# Patient Record
Sex: FEMALE | Race: WHITE | NOT HISPANIC OR LATINO | Employment: FULL TIME | URBAN - METROPOLITAN AREA
[De-identification: names, ages, dates, MRNs, and addresses within clinical notes are randomized per-mention and may not be internally consistent; named-entity substitution may affect disease eponyms.]

---

## 2017-01-27 ENCOUNTER — OFFICE VISIT (OUTPATIENT)
Dept: FAMILY MEDICINE | Facility: OTHER | Age: 27
End: 2017-01-27
Payer: COMMERCIAL

## 2017-01-27 VITALS — HEIGHT: 66 IN | BODY MASS INDEX: 22.5 KG/M2 | WEIGHT: 140 LBS

## 2017-01-27 DIAGNOSIS — R53.83 FATIGUE, UNSPECIFIED TYPE: ICD-10-CM

## 2017-01-27 DIAGNOSIS — N89.8 VAGINAL ITCHING: ICD-10-CM

## 2017-01-27 DIAGNOSIS — R10.9 LEFT FLANK DISCOMFORT: ICD-10-CM

## 2017-01-27 DIAGNOSIS — R45.0 JITTERY FEELING: ICD-10-CM

## 2017-01-27 DIAGNOSIS — R30.0 DYSURIA: Primary | ICD-10-CM

## 2017-01-27 LAB
ALBUMIN SERPL-MCNC: 4.1 G/DL (ref 3.4–5)
ALBUMIN UR-MCNC: NEGATIVE MG/DL
ALP SERPL-CCNC: 43 U/L (ref 40–150)
ALT SERPL W P-5'-P-CCNC: 30 U/L (ref 0–50)
ANION GAP SERPL CALCULATED.3IONS-SCNC: 9 MMOL/L (ref 3–14)
APPEARANCE UR: CLEAR
AST SERPL W P-5'-P-CCNC: 22 U/L (ref 0–45)
BACTERIA #/AREA URNS HPF: ABNORMAL /HPF
BASOPHILS # BLD AUTO: 0 10E9/L (ref 0–0.2)
BASOPHILS NFR BLD AUTO: 0.2 %
BILIRUB SERPL-MCNC: 0.7 MG/DL (ref 0.2–1.3)
BILIRUB UR QL STRIP: NEGATIVE
BUN SERPL-MCNC: 9 MG/DL (ref 7–30)
CALCIUM SERPL-MCNC: 8.7 MG/DL (ref 8.5–10.1)
CHLORIDE SERPL-SCNC: 106 MMOL/L (ref 94–109)
CO2 SERPL-SCNC: 25 MMOL/L (ref 20–32)
COLOR UR AUTO: YELLOW
CREAT SERPL-MCNC: 0.66 MG/DL (ref 0.52–1.04)
DIFFERENTIAL METHOD BLD: NORMAL
EOSINOPHIL # BLD AUTO: 0.2 10E9/L (ref 0–0.7)
EOSINOPHIL NFR BLD AUTO: 2.9 %
ERYTHROCYTE [DISTWIDTH] IN BLOOD BY AUTOMATED COUNT: 12.2 % (ref 10–15)
GFR SERPL CREATININE-BSD FRML MDRD: NORMAL ML/MIN/1.7M2
GLUCOSE SERPL-MCNC: 72 MG/DL (ref 70–99)
GLUCOSE UR STRIP-MCNC: NEGATIVE MG/DL
HCT VFR BLD AUTO: 40.2 % (ref 35–47)
HGB BLD-MCNC: 13.9 G/DL (ref 11.7–15.7)
HGB UR QL STRIP: ABNORMAL
KETONES UR STRIP-MCNC: 15 MG/DL
LEUKOCYTE ESTERASE UR QL STRIP: NEGATIVE
LYMPHOCYTES # BLD AUTO: 1.9 10E9/L (ref 0.8–5.3)
LYMPHOCYTES NFR BLD AUTO: 29.1 %
MCH RBC QN AUTO: 32.3 PG (ref 26.5–33)
MCHC RBC AUTO-ENTMCNC: 34.6 G/DL (ref 31.5–36.5)
MCV RBC AUTO: 93 FL (ref 78–100)
MICRO REPORT STATUS: NORMAL
MONOCYTES # BLD AUTO: 0.5 10E9/L (ref 0–1.3)
MONOCYTES NFR BLD AUTO: 6.8 %
NEUTROPHILS # BLD AUTO: 4.1 10E9/L (ref 1.6–8.3)
NEUTROPHILS NFR BLD AUTO: 61 %
NITRATE UR QL: NEGATIVE
PH UR STRIP: 6 PH (ref 5–7)
PLATELET # BLD AUTO: 306 10E9/L (ref 150–450)
POTASSIUM SERPL-SCNC: 3.6 MMOL/L (ref 3.4–5.3)
PROT SERPL-MCNC: 7.5 G/DL (ref 6.8–8.8)
RBC # BLD AUTO: 4.31 10E12/L (ref 3.8–5.2)
RBC #/AREA URNS AUTO: ABNORMAL /HPF (ref 0–2)
SODIUM SERPL-SCNC: 140 MMOL/L (ref 133–144)
SP GR UR STRIP: 1.02 (ref 1–1.03)
SPECIMEN SOURCE: NORMAL
TSH SERPL DL<=0.005 MIU/L-ACNC: 1.85 MU/L (ref 0.4–4)
URN SPEC COLLECT METH UR: ABNORMAL
UROBILINOGEN UR STRIP-ACNC: 0.2 EU/DL (ref 0.2–1)
WBC # BLD AUTO: 6.6 10E9/L (ref 4–11)
WBC #/AREA URNS AUTO: ABNORMAL /HPF (ref 0–2)
WET PREP SPEC: NORMAL

## 2017-01-27 PROCEDURE — 82306 VITAMIN D 25 HYDROXY: CPT | Performed by: NURSE PRACTITIONER

## 2017-01-27 PROCEDURE — 87210 SMEAR WET MOUNT SALINE/INK: CPT | Performed by: NURSE PRACTITIONER

## 2017-01-27 PROCEDURE — 85025 COMPLETE CBC W/AUTO DIFF WBC: CPT | Performed by: NURSE PRACTITIONER

## 2017-01-27 PROCEDURE — 81001 URINALYSIS AUTO W/SCOPE: CPT | Performed by: NURSE PRACTITIONER

## 2017-01-27 PROCEDURE — 80053 COMPREHEN METABOLIC PANEL: CPT | Performed by: NURSE PRACTITIONER

## 2017-01-27 PROCEDURE — 84443 ASSAY THYROID STIM HORMONE: CPT | Performed by: NURSE PRACTITIONER

## 2017-01-27 PROCEDURE — 99214 OFFICE O/P EST MOD 30 MIN: CPT | Mod: 25 | Performed by: NURSE PRACTITIONER

## 2017-01-27 PROCEDURE — 36415 COLL VENOUS BLD VENIPUNCTURE: CPT | Performed by: NURSE PRACTITIONER

## 2017-01-27 RX ORDER — FLUCONAZOLE 150 MG/1
150 TABLET ORAL ONCE
Qty: 1 TABLET | Refills: 0 | Status: SHIPPED | OUTPATIENT
Start: 2017-01-27 | End: 2017-01-27

## 2017-01-27 ASSESSMENT — PAIN SCALES - GENERAL: PAINLEVEL: MODERATE PAIN (4)

## 2017-01-27 NOTE — PATIENT INSTRUCTIONS
Please increase fluids try to avoid sugary drinks. I will contact you with your lab results via my chart.     Thank you  Hiwot Giang CNP

## 2017-01-27 NOTE — MR AVS SNAPSHOT
After Visit Summary   1/27/2017    Zuleyka Leslie    MRN: [id number]           Patient Information     Date Of Birth          1990        Visit Information        Provider Department      1/27/2017 12:00 PM Hiwot Giang APRN CNP [facility]        Today's Diagnoses     Dysuria    -  1     Vaginal itching         Fatigue, unspecified type         Left flank discomfort         Jittery feeling           Care Instructions    Please increase fluids try to avoid sugary drinks. I will contact you with your lab results via my chart.     Thank you  Hiwot Giang CNP          Follow-ups after your visit        Who to contact     If you have questions or need follow up information about today's clinic visit or your schedule please contact [facility] directly at [phone].  Normal or non-critical lab and imaging results will be communicated to you by MyChart, letter or phone within 4 business days after the clinic has received the results. If you do not hear from us within 7 days, please contact the clinic through MyChart or phone. If you have a critical or abnormal lab result, we will notify you by phone as soon as possible.  Submit refill requests through [employer] or call your pharmacy and they will forward the refill request to us. Please allow 3 business days for your refill to be completed.          Additional Information About Your Visit        MyChart Information     [employer] gives you secure access to your electronic health record. If you see a primary care provider, you can also send messages to your care team and make appointments. If you have questions, please call your primary care clinic.  If you do not have a primary care provider, please call [phone] and they will assist you.        Care EveryWhere ID     This is your Care EveryWhere ID. This could be used by other organizations to access your [facility] medical records  [id number]       "  Your Vitals Were     Height BMI (Body Mass Index) Last Period             5' 6.25\" (1.683 m) 22.42 kg/m2 01/25/2017 (Approximate)          Blood Pressure from Last 3 Encounters:   10/12/16 112/66   06/20/16 111/69   05/12/15 112/70    Weight from Last 3 Encounters:   01/27/17 140 lb (63.504 kg)   10/12/16 138 lb 8 oz (62.823 kg)   06/20/16 134 lb 12.8 oz (61.145 kg)              We Performed the Following     *UA reflex to Microscopic and Culture ([facility], [place] and [facility] (except [place] and [place])     CBC with platelets and differential     Comprehensive metabolic panel (BMP + Alb, Alk Phos, ALT, AST, Total. Bili, TP)     TSH with free T4 reflex     Urine Microscopic     Vitamin D Deficiency     Wet prep        Primary Care Provider Office Phone # Fax #    Mine NADINE Unger [facility] [phone]       [facility] [address] [facility] [address]        Thank you!     Thank you for choosing [facility]  for your care. Our goal is always to provide you with excellent care. Hearing back from our patients is one way we can continue to improve our services. Please take a few minutes to complete the written survey that you may receive in the mail after your visit with us. Thank you!             Your Updated Medication List - Protect others around you: Learn how to safely use, store and throw away your medicines at www.disposemymeds.org.          This list is accurate as of: 1/27/17 12:47 PM.  Always use your most recent med list.                   Brand Name Dispense Instructions for use    levonorgestrel-ethinyl estradiol 0.15-0.03 MG per tablet    SEASONALE    91 tablet    Take 1 tablet by mouth daily         "

## 2017-01-27 NOTE — PROGRESS NOTES
"  SUBJECTIVE:                                                    Zuleyka Leslie is a 26 year old female who presents to clinic today for the following health issues:      HPI    URINARY TRACT SYMPTOMS     Onset: last week      Description:   Painful urination (Dysuria): no   Blood in urine (Hematuria): no   Delay in urine (Hesitency): YES- just this week but urgency    Intensity: moderate    Progression of Symptoms:  worsening and same    Accompanying Signs & Symptoms:  Fever/chills: YES- last week but not really this week   Flank pain no but is having back pain   Nausea and vomiting: YES- nausea one day last week   Any vaginal symptoms: vaginal discharge, vaginal odor and vaginal itching  Abdominal/Pelvic Pain: no    History:   History of frequent UTI's: no   History of kidney stones: no   Sexually Active: YES  Possibility of pregnancy: No    Precipitating factors:   None          Therapies Tried and outcome: Increase fluid intake  State she has been very tired lately and low energy. She states she feel dizzy if she is not eating enough. Will check additional labs for anemia, hypoglycemia      Problem list and histories reviewed & adjusted, as indicated.  Additional history: as documented        Labs reviewed in EPIC  Problem list, Medication list, Allergies, and Medical/Social/Surgical histories reviewed in [facility] and updated as appropriate.    ROS:  Constitutional, HEENT, cardiovascular, pulmonary, GI, , musculoskeletal, neuro, skin, endocrine and psych systems are negative, except as otherwise noted.    OBJECTIVE:                                                    Ht 5' 6.25\" (1.683 m)  Wt 140 lb (63.504 kg)  BMI 22.42 kg/m2  LMP 01/25/2017 (Approximate)  Body mass index is 22.42 kg/(m^2).  GENERAL: healthy, alert, no distress and pale  EYES: Eyes grossly normal to inspection, PERRL and conjunctivae and sclerae normal  HENT: ear canals and TM's normal, nose and mouth without ulcers or lesions  NECK: no " adenopathy, no asymmetry, masses, or scars and thyroid normal to palpation  RESP: lungs clear to auscultation - no rales, rhonchi or wheezes  CV: regular rate and rhythm, normal S1 S2, no S3 or S4, no murmur, click or rub, no peripheral edema and peripheral pulses strong  ABDOMEN: soft, nontender, no hepatosplenomegaly, no masses and bowel sounds normal  MS: no gross musculoskeletal defects noted, no edema  SKIN: no suspicious lesions or rashes  NEURO: Normal strength and tone, mentation intact and speech normal  BACK: no CVA tenderness, no paralumbar tenderness    Diagnostic Test Results:  Results for orders placed or performed in visit on 01/27/17 (from the past 24 hour(s))   *UA reflex to Microscopic and Culture ([facility] and [facility] (except [place] and [place])   Result Value Ref Range    Color Urine Yellow     Appearance Urine Clear     Glucose Urine Negative NEG mg/dL    Bilirubin Urine Negative NEG    Ketones Urine 15 (A) NEG mg/dL    Specific Gravity Urine 1.020 1.003 - 1.035    Blood Urine Moderate (A) NEG    pH Urine 6.0 5.0 - 7.0 pH    Protein Albumin Urine Negative NEG mg/dL    Urobilinogen Urine 0.2 0.2 - 1.0 EU/dL    Nitrite Urine Negative NEG    Leukocyte Esterase Urine Negative NEG    Source Unspecified Urine    Urine Microscopic   Result Value Ref Range    WBC Urine O - 2 0 - 2 /HPF    RBC Urine 2-5 (A) 0 - 2 /HPF    Bacteria Urine Few (A) NEG /HPF   Wet prep   Result Value Ref Range    Specimen Description Vagina     Wet Prep       No Trichomonas seen  No clue cells seen  No yeast seen      Micro Report Status FINAL 01/27/2017    CBC with platelets and differential   Result Value Ref Range    WBC 6.6 4.0 - 11.0 10e9/L    RBC Count 4.31 3.8 - 5.2 10e12/L    Hemoglobin 13.9 11.7 - 15.7 g/dL    Hematocrit 40.2 35.0 - 47.0 %    MCV 93 78 - 100 fl    MCH 32.3 26.5 - 33.0 pg    MCHC 34.6 31.5 - 36.5 g/dL    RDW 12.2 10.0 - 15.0 %    Platelet Count 306 150 - 450 10e9/L    Diff Method  Automated Method     % Neutrophils 61.0 %    % Lymphocytes 29.1 %    % Monocytes 6.8 %    % Eosinophils 2.9 %    % Basophils 0.2 %    Absolute Neutrophil 4.1 1.6 - 8.3 10e9/L    Absolute Lymphocytes 1.9 0.8 - 5.3 10e9/L    Absolute Monocytes 0.5 0.0 - 1.3 10e9/L    Absolute Eosinophils 0.2 0.0 - 0.7 10e9/L    Absolute Basophils 0.0 0.0 - 0.2 10e9/L   TSH with free T4 reflex   Result Value Ref Range    TSH 1.85 0.40 - 4.00 mU/L   Comprehensive metabolic panel (BMP + Alb, Alk Phos, ALT, AST, Total. Bili, TP)   Result Value Ref Range    Sodium 140 133 - 144 mmol/L    Potassium 3.6 3.4 - 5.3 mmol/L    Chloride 106 94 - 109 mmol/L    Carbon Dioxide 25 20 - 32 mmol/L    Anion Gap 9 3 - 14 mmol/L    Glucose 72 70 - 99 mg/dL    Urea Nitrogen 9 7 - 30 mg/dL    Creatinine 0.66 0.52 - 1.04 mg/dL    GFR Estimate >90  Non  GFR Calc   >60 mL/min/1.7m2    GFR Estimate If Black >90   GFR Calc   >60 mL/min/1.7m2    Calcium 8.7 8.5 - 10.1 mg/dL    Bilirubin Total 0.7 0.2 - 1.3 mg/dL    Albumin 4.1 3.4 - 5.0 g/dL    Protein Total 7.5 6.8 - 8.8 g/dL    Alkaline Phosphatase 43 40 - 150 U/L    ALT 30 0 - 50 U/L    AST 22 0 - 45 U/L        ASSESSMENT/PLAN:                                                        1. Dysuria  Negative   - *UA reflex to Microscopic and Culture ([facility] and [facility] (except [place] and [place]); Future  - *UA reflex to Microscopic and Culture ([facility] and [facility] (except [place] and [place])  - Urine Microscopic    2. Vaginal itching  negative  - Wet prep; Future  - Wet prep  - fluconazole (DIFLUCAN) 150 MG tablet; Take 1 tablet (150 mg) by mouth once for 1 dose  Dispense: 1 tablet; Refill: 0    3. Fatigue, unspecified type    - CBC with platelets and differential  - TSH with free T4 reflex  - Vitamin D Deficiency    4. Left flank discomfort    - Comprehensive metabolic panel (BMP + Alb, Alk Phos, ALT, AST, Total. Bili, TP)    5. Jittery  feeling    - TSH with free T4 reflex    See Patient Instructions    NADINE Arboleda [facility]

## 2017-01-30 DIAGNOSIS — R31.9 HEMATURIA: Primary | ICD-10-CM

## 2017-01-30 LAB — DEPRECATED CALCIDIOL+CALCIFEROL SERPL-MC: 28 UG/L (ref 20–75)

## 2017-01-31 NOTE — PROGRESS NOTES
Quick Note:    Zuleyka  Your vitamin D level is normal. Please contact me if you have any questions through my-chart or at [phone].    Hiwot Giang CNP  ______

## 2017-01-31 NOTE — PROGRESS NOTES
Quick Note:    Kevin Gardiner   I am still waiting for your vitamin D to return however I wanted to let you know that your labs so far have been negative for anemia. Your kidneys function looks good and your glucose (blood sugar) is normal. Have you had a kidney stone in the past? Blood is usually present in the urine with a kidney stone these can pass on their own often times. I recommend having a repeat urine analysis in a few weeks to make sure the blood is gone. I can place this order for you so you can just come in and go to lab. I will get the results.   I will notify you of the vitamin d when it comes in.     Thank you  Hiwot Giang CNP    ______

## 2017-02-05 ENCOUNTER — HOSPITAL ENCOUNTER (EMERGENCY)
Facility: CLINIC | Age: 27
Discharge: HOME | End: 2017-02-05
Attending: EMERGENCY MEDICINE | Admitting: EMERGENCY MEDICINE
Payer: COMMERCIAL

## 2017-02-05 VITALS
DIASTOLIC BLOOD PRESSURE: 82 MMHG | SYSTOLIC BLOOD PRESSURE: 151 MMHG | OXYGEN SATURATION: 100 % | WEIGHT: 139 LBS | TEMPERATURE: 97.8 F | RESPIRATION RATE: 18 BRPM | HEART RATE: 100 BPM | BODY MASS INDEX: 22.26 KG/M2

## 2017-02-05 DIAGNOSIS — F32.2 MAJOR DEPRESSIVE DISORDER, SINGLE EPISODE, SEVERE WITHOUT PSYCHOTIC FEATURES (H): ICD-10-CM

## 2017-02-05 LAB
ALBUMIN SERPL-MCNC: 3.9 G/DL (ref 3.4–5)
ALP SERPL-CCNC: 46 U/L (ref 40–150)
ALT SERPL W P-5'-P-CCNC: 20 U/L (ref 0–50)
ANION GAP SERPL CALCULATED.3IONS-SCNC: 9 MMOL/L (ref 3–14)
APAP SERPL-MCNC: NORMAL MG/L (ref 10–20)
AST SERPL W P-5'-P-CCNC: 11 U/L (ref 0–45)
B-HCG SERPL-ACNC: <1 IU/L
BASOPHILS # BLD AUTO: 0 10E9/L (ref 0–0.2)
BASOPHILS NFR BLD AUTO: 0.5 %
BILIRUB SERPL-MCNC: 0.4 MG/DL (ref 0.2–1.3)
BUN SERPL-MCNC: 13 MG/DL (ref 7–30)
CALCIUM SERPL-MCNC: 8.3 MG/DL (ref 8.5–10.1)
CHLORIDE SERPL-SCNC: 107 MMOL/L (ref 94–109)
CO2 SERPL-SCNC: 26 MMOL/L (ref 20–32)
CREAT SERPL-MCNC: 0.69 MG/DL (ref 0.52–1.04)
DIFFERENTIAL METHOD BLD: NORMAL
EOSINOPHIL # BLD AUTO: 0.4 10E9/L (ref 0–0.7)
EOSINOPHIL NFR BLD AUTO: 4.6 %
ERYTHROCYTE [DISTWIDTH] IN BLOOD BY AUTOMATED COUNT: 12.5 % (ref 10–15)
ETHANOL SERPL-MCNC: <0.01 G/DL
GFR SERPL CREATININE-BSD FRML MDRD: ABNORMAL ML/MIN/1.7M2
GLUCOSE SERPL-MCNC: 84 MG/DL (ref 70–99)
HCT VFR BLD AUTO: 42.8 % (ref 35–47)
HGB BLD-MCNC: 14.6 G/DL (ref 11.7–15.7)
IMM GRANULOCYTES # BLD: 0 10E9/L (ref 0–0.4)
IMM GRANULOCYTES NFR BLD: 0.1 %
LYMPHOCYTES # BLD AUTO: 1.8 10E9/L (ref 0.8–5.3)
LYMPHOCYTES NFR BLD AUTO: 23 %
MCH RBC QN AUTO: 31.7 PG (ref 26.5–33)
MCHC RBC AUTO-ENTMCNC: 34.1 G/DL (ref 31.5–36.5)
MCV RBC AUTO: 93 FL (ref 78–100)
MONOCYTES # BLD AUTO: 0.4 10E9/L (ref 0–1.3)
MONOCYTES NFR BLD AUTO: 5.6 %
NEUTROPHILS # BLD AUTO: 5.1 10E9/L (ref 1.6–8.3)
NEUTROPHILS NFR BLD AUTO: 66.2 %
PLATELET # BLD AUTO: 318 10E9/L (ref 150–450)
POTASSIUM SERPL-SCNC: 3.4 MMOL/L (ref 3.4–5.3)
PROT SERPL-MCNC: 7.3 G/DL (ref 6.8–8.8)
RBC # BLD AUTO: 4.61 10E12/L (ref 3.8–5.2)
SALICYLATES SERPL-MCNC: NORMAL MG/DL
SODIUM SERPL-SCNC: 142 MMOL/L (ref 133–144)
WBC # BLD AUTO: 7.7 10E9/L (ref 4–11)

## 2017-02-05 PROCEDURE — 99285 EMERGENCY DEPT VISIT HI MDM: CPT | Mod: 25

## 2017-02-05 PROCEDURE — 90791 PSYCH DIAGNOSTIC EVALUATION: CPT

## 2017-02-05 PROCEDURE — 80329 ANALGESICS NON-OPIOID 1 OR 2: CPT | Performed by: EMERGENCY MEDICINE

## 2017-02-05 PROCEDURE — 84702 CHORIONIC GONADOTROPIN TEST: CPT | Performed by: EMERGENCY MEDICINE

## 2017-02-05 PROCEDURE — 99284 EMERGENCY DEPT VISIT MOD MDM: CPT | Performed by: EMERGENCY MEDICINE

## 2017-02-05 PROCEDURE — 85025 COMPLETE CBC W/AUTO DIFF WBC: CPT | Performed by: EMERGENCY MEDICINE

## 2017-02-05 PROCEDURE — 80320 DRUG SCREEN QUANTALCOHOLS: CPT | Performed by: EMERGENCY MEDICINE

## 2017-02-05 PROCEDURE — 80053 COMPREHEN METABOLIC PANEL: CPT | Performed by: EMERGENCY MEDICINE

## 2017-02-05 RX ORDER — LIDOCAINE 40 MG/G
CREAM TOPICAL
Status: DISCONTINUED | OUTPATIENT
Start: 2017-02-05 | End: 2017-02-05

## 2017-02-05 RX ORDER — FLUOXETINE 10 MG/1
10 CAPSULE ORAL DAILY
Qty: 30 CAPSULE | Refills: 1 | Status: SHIPPED | OUTPATIENT
Start: 2017-02-05 | End: 2017-05-15 | Stop reason: ALTCHOICE

## 2017-02-05 ASSESSMENT — ENCOUNTER SYMPTOMS
NERVOUS/ANXIOUS: 1
DYSPHORIC MOOD: 1
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
## 2017-02-05 NOTE — ED AVS SNAPSHOT
[facility] Emergency Department    [address] [place] [address]    Phone:  [phone]    Fax:  [phone]                                       Zuleyka Leslie   MRN: [id number]    Department:  [facility] Emergency Department   Date of Visit:  2/5/2017           Patient Information     Date Of Birth          1990        Your diagnoses for this visit were:     Major depressive disorder, single episode, severe without psychotic features (H)        You were seen by Isaac Beth MD.      Follow-up Information     Follow up with Mine Bal APRN CNP On 2/13/2017.    Specialty:  Nurse Practitioner - Women's Health    Why:  at 9:30AM    Contact information:    [employer]  [address] [facility] [address]  [phone]          Discharge Instructions         Depression Affects Your Mind and Body  Everyone feels sad or  blue  from time to time for a few days or weeks. Depression is when these feelings don't go away and they interfere with daily life.  Depression is a real illness. It makes you feel sad and helpless. It gets in the way of your life and relationships. It inhibits your ability to think and act. But, with help, you can feel better again.      When I was depressed, I felt awful. I was so tired all the time I could hardly think, but at night I couldn t fall asleep. My head hurt. My stomach hurt. I didn t know what was wrong with me.    Depression affects your whole body  Brain chemicals affect your body as well as your mood. So depression may do more than just make you feel low. You may also feel bad physically. Depression can:    Cause trouble with mental tasks such as remembering, concentrating, or making decisions    Make you feel nervous and jumpy    Cause trouble sleeping. Or you may sleep too much    Change your appetite    Cause headaches, stomachaches, or other aches and pains    Drain your body of energy  Depression and  other illness  It is common for people who have chronic health problems to also have depression. It can often be hard to tell which one caused the other. A person might become depressed after finding out they have a health problem. But some studies suggest being depressed may make certain health problems more likely. And some depressed people stop taking care of themselves. This may make them more likely to get sick.    9103-1250 [employer]. [address] [place] [address], [place] [address]. All rights reserved. This information is not intended as a substitute for professional medical care. Always follow your healthcare professional's instructions.          Know the Signs and Symptoms of Depression  Everyone feels down at times. The blues are a natural part of life. But an unhappy period that s intense or lasts for more than a couple of weeks can be a sign of depression. Depression is a serious illness. It can disrupt the lives of family and friends. If you know someone you think may be depressed, find out what you can do to help.    Recognizing signs of depression  People who are depressed may:    Feel unhappy, sad, blue, down, or miserable nearly every day    Feel helpless, hopeless, or worthless    Lose interest in hobbies, friends, and activities that used to give pleasure    Not sleep well or sleep too much    Gain or lose weight    Feel low on energy or constantly tired    Have a hard time concentrating or making decisions    Lose interest in sex    Have physical symptoms, such as stomachaches, headaches, or backaches  Know the serious signals  Warning signals for suicide include:    Threats or talk of suicide    Statements such as  I won t be a problem much longer  or  Nothing matters     Giving away possessions or making a will or  arrangements    Buying a gun or other weapon    Sudden, unexplained cheerfulness or calm after a period of depression  If you notice any of these signs, get help  right away. Call a health care professional, mental health clinic, or suicide hotline and ask what action to take. In an emergency, don t hesitate to call the police.  Resources:    National Institutes of Mental Ogirnu678-061-0460sui.[facility].nih.gov    National [place] on Mental Iuzjphi383-985-6900brx.fior.org     Mental Health Qxavkfd262-603-7665otv.[facility].org    National Suicide Lbvkzrx604-724-5055 (800-SUICIDE)    National Suicide Prevention Tmvuetol718-157-3449 (542-580-VXJP)www.UpsideicideVesselVanguard.org     1067-3287 [employer]. [address] [place] [address], [place] [address]. All rights reserved. This information is not intended as a substitute for professional medical care. Always follow your healthcare professional's instructions.          Future Appointments        Provider Department Dept Phone Center    2/13/2017 9:30 AM NADINE Garcia [facility] [phone] [facility]      24 Hour Appointment Hotline       To make an appointment at any [facility], call [phone] ([phone]). If you don't have a family doctor or clinic, we will help you find one. [facility] are conveniently located to serve the needs of you and your family.             Review of your medicines      START taking        Dose / Directions Last dose taken    FLUoxetine 10 MG capsule   Commonly known as:  PROzac   Dose:  10 mg   Quantity:  30 capsule        Take 1 capsule (10 mg) by mouth daily   Refills:  1          Our records show that you are taking the medicines listed below. If these are incorrect, please call your family doctor or clinic.        Dose / Directions Last dose taken    levonorgestrel-ethinyl estradiol 0.15-0.03 MG per tablet   Commonly known as:  SEASONALE   Dose:  1 tablet   Quantity:  91 tablet        Take 1 tablet by mouth daily   Refills:  3                Prescriptions were sent or printed at these locations (1 Prescription)                   [facility]   [address] [address]    Telephone:  [phone]   Fax:  [phone]   Hours:                  These medications are not ready yet because we are checking if your insurance will help you pay for them. Call your pharmacy to confirm that your medication is ready for pickup. It may take up to 24 hours for them to receive the prescription. If the prescription is not ready within 3 business days, please contact your clinic or your provider (1 of 1)         FLUoxetine (PROZAC) 10 MG capsule                Procedures and tests performed during your visit     Acetaminophen level    CBC with platelets differential    Comprehensive metabolic panel    Ethanol level    HCG quantitative pregnancy (blood)    Nursing observation: Patient watch - if high risk    Peripheral IV: Standard    Remove belongings from room    Salicylate level    Undress, search      Orders Needing Specimen Collection     Ordered          02/05/17 1427  Drug abuse screen - ROUTINE, Prio: Routine, Needs to be Collected     Scheduled Task Status   02/05/17 1427 Collect Drug abuse screen Open   Order Class:  PCU Collect                02/05/17 1428  *UA reflex to Microscopic - STAT, Prio: STAT, Needs to be Collected     Scheduled Task Status   02/05/17 1428 Collect *UA reflex to Microscopic Open   Order Class:  PCU Collect                  Pending Results     No orders found from 2/4/2017 to 2/6/2017.            Pending Culture Results     No orders found from 2/4/2017 to 2/6/2017.            Thank you for choosing [facility]       Thank you for choosing [facility] for your care. Our goal is always to provide you with excellent care. Hearing back from our patients is one way we can continue to improve our services. Please take a few minutes to complete the written survey that you may receive in the mail after you visit with us. Thank you!        [employer] Information     [employer] gives you secure access to your  electronic health record. If you see a primary care provider, you can also send messages to your care team and make appointments. If you have questions, please call your primary care clinic.  If you do not have a primary care provider, please call [phone] and they will assist you.        Care EveryWhere ID     This is your Care EveryWhere ID. This could be used by other organizations to access your [facility] medical records  [id number]        After Visit Summary       This is your record. Keep this with you and show to your community pharmacist(s) and doctor(s) at your next visit.

## 2017-02-05 NOTE — ED AVS SNAPSHOT
[facility] Emergency Department    [address] [place] [address]    Phone:  [phone]    Fax:  [phone]                                       Zuleyka Leslie   MRN: [id number]    Department:  [facility] Emergency Department   Date of Visit:  2/5/2017           After Visit Summary Signature Page     I have received my discharge instructions, and my questions have been answered. I have discussed any challenges I see with this plan with the nurse or doctor.    ..........................................................................................................................................  Patient/Patient Representative Signature      ..........................................................................................................................................  Patient Representative Print Name and Relationship to Patient    ..................................................               ................................................  Date                                            Time    ..........................................................................................................................................  Reviewed by Signature/Title    ...................................................              ..............................................  Date                                                            Time

## 2017-02-05 NOTE — ED NOTES
Patient here with suicidal thoughts. She comes in with her SO, who is waiting in the lobby. Patient's clothing and personal belongings taken from the room and patient placed in paper scrubs. Belongings labeled and locked in secure cart, drawer 5*.

## 2017-02-05 NOTE — DISCHARGE INSTRUCTIONS
Depression Affects Your Mind and Body  Everyone feels sad or  blue  from time to time for a few days or weeks. Depression is when these feelings don't go away and they interfere with daily life.  Depression is a real illness. It makes you feel sad and helpless. It gets in the way of your life and relationships. It inhibits your ability to think and act. But, with help, you can feel better again.      When I was depressed, I felt awful. I was so tired all the time I could hardly think, but at night I couldn t fall asleep. My head hurt. My stomach hurt. I didn t know what was wrong with me.    Depression affects your whole body  Brain chemicals affect your body as well as your mood. So depression may do more than just make you feel low. You may also feel bad physically. Depression can:    Cause trouble with mental tasks such as remembering, concentrating, or making decisions    Make you feel nervous and jumpy    Cause trouble sleeping. Or you may sleep too much    Change your appetite    Cause headaches, stomachaches, or other aches and pains    Drain your body of energy  Depression and other illness  It is common for people who have chronic health problems to also have depression. It can often be hard to tell which one caused the other. A person might become depressed after finding out they have a health problem. But some studies suggest being depressed may make certain health problems more likely. And some depressed people stop taking care of themselves. This may make them more likely to get sick.    4449-6334 [employer]. [address] [place] [address], [place] [address]. All rights reserved. This information is not intended as a substitute for professional medical care. Always follow your healthcare professional's instructions.          Know the Signs and Symptoms of Depression  Everyone feels down at times. The blues are a natural part of life. But an unhappy period that s intense or lasts for more than a  couple of weeks can be a sign of depression. Depression is a serious illness. It can disrupt the lives of family and friends. If you know someone you think may be depressed, find out what you can do to help.    Recognizing signs of depression  People who are depressed may:    Feel unhappy, sad, blue, down, or miserable nearly every day    Feel helpless, hopeless, or worthless    Lose interest in hobbies, friends, and activities that used to give pleasure    Not sleep well or sleep too much    Gain or lose weight    Feel low on energy or constantly tired    Have a hard time concentrating or making decisions    Lose interest in sex    Have physical symptoms, such as stomachaches, headaches, or backaches  Know the serious signals  Warning signals for suicide include:    Threats or talk of suicide    Statements such as  I won t be a problem much longer  or  Nothing matters     Giving away possessions or making a will or  arrangements    Buying a gun or other weapon    Sudden, unexplained cheerfulness or calm after a period of depression  If you notice any of these signs, get help right away. Call a health care professional, mental health clinic, or suicide hotline and ask what action to take. In an emergency, don t hesitate to call the police.  Resources:    National Institutes of Mental Kssmxj434-556-7520rsu.[facility].nih.gov    National [place] on Mental Hefqbqp760-462-4827fxg.fior.org     Mental Health Blrnwdi003-204-2637ijr.nmha.org    National Suicide Hljzfoa665-663-0712 (800-SUICIDE)    National Suicide Prevention Powgbube028-043-1341 (690-623-PFSA)www.Master Routeicidepreventionlifeline.org     5992-2549 [employer]. [address] [place] [address]. All rights reserved. This information is not intended as a substitute for professional medical care. Always follow your healthcare professional's instructions.

## 2017-02-23 NOTE — PROGRESS NOTES
"  SUBJECTIVE:                                                    Zuleyka Leslie is a 27 year old female who presents to clinic today for the following health issues:    Wants to switch to different Birth control pill, she is super sick on this one.  Has 3 month continuous dose and she is having her period more often than she should be, very frequently, if not bleeding it's spotting.  Doesn't miss her pills  First yeast infection on this pill   First time having painful intercourse on this pill      Patient has been on her current OCP for about 6 months and reports breakthrough bleeding and mood swings as well as concern about acne increase. She would like to be switched to a different OCP. She specifically requests the one that helps with acne.   -------------------------------------    Problem list and histories reviewed & adjusted, as indicated.  Additional history: as documented    BP Readings from Last 3 Encounters:   02/27/17 98/68   02/05/17 151/82   10/12/16 112/66    Wt Readings from Last 3 Encounters:   02/27/17 140 lb 9.6 oz (63.8 kg)   02/05/17 139 lb (63 kg)   01/27/17 140 lb (63.5 kg)           Problem list, Medication list, Allergies, and Medical/Social/Surgical histories reviewed in EPIC and updated as appropriate.    ROS:  Constitutional, HEENT, cardiovascular, pulmonary, gi and gu systems are negative, except as otherwise noted.    OBJECTIVE:                                                    BP 98/68 (BP Location: Left arm, Patient Position: Chair, Cuff Size: Adult Regular)  Pulse 80  Temp 98.2  F (36.8  C) (Temporal)  Resp 16  Ht 5' 6.5\" (1.689 m)  Wt 140 lb 9.6 oz (63.8 kg)  LMP 02/26/2017  BMI 22.35 kg/m2  Body mass index is 22.35 kg/(m^2).  GENERAL: healthy, alert and no distress  SKIN: no suspicious lesions or rashes  PSYCH: mentation appears normal, affect normal/bright    Diagnostic Test Results:  none      ASSESSMENT/PLAN:                                                        " ICD-10-CM    1. Encounter for surveillance of contraceptives Z30.40 drospirenone-ethinyl estradiol (ROBYN) 3-0.03 MG per tablet       I discussed OCP options as well as some other non pill birth control. I will switch her to an alternate medication and have her follow up if symptoms are persisting or she has other questions or concerns.  See Patient Instructions    Mignon Bell PA-C  [facility]

## 2017-02-27 ENCOUNTER — OFFICE VISIT (OUTPATIENT)
Dept: FAMILY MEDICINE | Facility: OTHER | Age: 27
End: 2017-02-27
Payer: COMMERCIAL

## 2017-02-27 VITALS
DIASTOLIC BLOOD PRESSURE: 68 MMHG | WEIGHT: 140.6 LBS | TEMPERATURE: 98.2 F | RESPIRATION RATE: 16 BRPM | HEART RATE: 80 BPM | SYSTOLIC BLOOD PRESSURE: 98 MMHG | BODY MASS INDEX: 22.07 KG/M2 | HEIGHT: 67 IN

## 2017-02-27 DIAGNOSIS — Z30.40 ENCOUNTER FOR SURVEILLANCE OF CONTRACEPTIVES: Primary | ICD-10-CM

## 2017-02-27 PROCEDURE — 99213 OFFICE O/P EST LOW 20 MIN: CPT | Performed by: PHYSICIAN ASSISTANT

## 2017-02-27 RX ORDER — DROSPIRENONE AND ETHINYL ESTRADIOL 0.03MG-3MG
1 KIT ORAL DAILY
Qty: 28 TABLET | Refills: 11 | Status: SHIPPED | OUTPATIENT
Start: 2017-02-27 | End: 2017-06-19

## 2017-02-27 ASSESSMENT — PATIENT HEALTH QUESTIONNAIRE - PHQ9: 5. POOR APPETITE OR OVEREATING: SEVERAL DAYS

## 2017-02-27 ASSESSMENT — PAIN SCALES - GENERAL: PAINLEVEL: NO PAIN (0)

## 2017-02-27 ASSESSMENT — ANXIETY QUESTIONNAIRES
5. BEING SO RESTLESS THAT IT IS HARD TO SIT STILL: SEVERAL DAYS
GAD7 TOTAL SCORE: 8
6. BECOMING EASILY ANNOYED OR IRRITABLE: NOT AT ALL
2. NOT BEING ABLE TO STOP OR CONTROL WORRYING: MORE THAN HALF THE DAYS
3. WORRYING TOO MUCH ABOUT DIFFERENT THINGS: MORE THAN HALF THE DAYS
7. FEELING AFRAID AS IF SOMETHING AWFUL MIGHT HAPPEN: NOT AT ALL
1. FEELING NERVOUS, ANXIOUS, OR ON EDGE: MORE THAN HALF THE DAYS
IF YOU CHECKED OFF ANY PROBLEMS ON THIS QUESTIONNAIRE, HOW DIFFICULT HAVE THESE PROBLEMS MADE IT FOR YOU TO DO YOUR WORK, TAKE CARE OF THINGS AT HOME, OR GET ALONG WITH OTHER PEOPLE: VERY DIFFICULT

## 2017-02-27 NOTE — NURSING NOTE
"Chief Complaint   Patient presents with     Contraception     Options     Panel Management     HPV, Flu shot, phq, dyan       Initial BP 98/68 (BP Location: Left arm, Patient Position: Chair, Cuff Size: Adult Regular)  Pulse 80  Temp 98.2  F (36.8  C) (Temporal)  Resp 16  Ht 5' 6.5\" (1.689 m)  Wt 140 lb 9.6 oz (63.8 kg)  LMP 02/26/2017  BMI 22.35 kg/m2 Estimated body mass index is 22.35 kg/(m^2) as calculated from the following:    Height as of this encounter: 5' 6.5\" (1.689 m).    Weight as of this encounter: 140 lb 9.6 oz (63.8 kg).  Medication Reconciliation: carla Winter, CHERELLE    "

## 2017-02-27 NOTE — MR AVS SNAPSHOT
After Visit Summary   2/27/2017    Zuleyka Leslie    MRN: [id number]           Patient Information     Date Of Birth          1990        Visit Information        Provider Department      2/27/2017 10:00 AM Mignon Bell PA-C [facility]'s Diagnoses     Encounter for surveillance of contraceptives    -  1      Care Instructions      Birth Control: The Pill    Birth control pills contain hormones that help prevent pregnancy. The pills are prescribed by your health care provider. There are many types of birth control pills available. If you have side effects from one type of pill, tell your health care provider. He or she may be able to prescribe a pill that works better for you.  Pregnancy rates  Talk to your health care provider about the effectiveness of this birth control method.  Using the pill    Take one pill daily. Take it at around the same time each day.    Follow your health care provider s guidelines on when to start your first pack of pills. You may need to use another form of birth control for a week or more after you start.    Know what to do if you forget to take a pill. (Consult your health care provider or check the package.) If you miss more than one pill, you may need to use a backup method of birth control for a week or more.  Pros    Low pregnancy rate.    No interruption to sex.    Easy to use.    Can help make periods more regular.    May lower your risk of ovarian cysts and certain cancers.    May decrease menstrual cramps, menstrual flow, and acne.  Cons    Does not protect against sexually transmitted infection (STIs).    Requires taking a pill on time each day.    May not work as well when taken with certain other medications. Check with your pharmacist.    May cause side effects such as nausea, irregular bleeding, headaches, breast tenderness, fatigue, or mood changes. These often go away within 3 months.    May increase the risk of  blood clots, heart attack, and stroke.  The pill may not be for you if:    You are a smoker and over age 35.    You have high blood pressure or gallbladder, liver, cerebrovascular or heart disease.    You have diabetes, migraines, blood clot in the vein or artery, lupus, depression, certain lipid disorders, or take medications that interfere with the pill.  In these cases, discuss the risks with your health care provider.    7516-0480 [employer]. [address] [place] [address], [place] [address]. All rights reserved. This information is not intended as a substitute for professional medical care. Always follow your healthcare professional's instructions.              Follow-ups after your visit        Who to contact     If you have questions or need follow up information about today's clinic visit or your schedule please contact [facility] directly at [phone].  Normal or non-critical lab and imaging results will be communicated to you by MyChart, letter or phone within 4 business days after the clinic has received the results. If you do not hear from us within 7 days, please contact the clinic through [employer] or phone. If you have a critical or abnormal lab result, we will notify you by phone as soon as possible.  Submit refill requests through [email or web address] or call your pharmacy and they will forward the refill request to us. Please allow 3 business days for your refill to be completed.          Additional Information About Your Visit        [email or web address] Information     [email or web address] gives you secure access to your electronic health record. If you see a primary care provider, you can also send messages to your care team and make appointments. If you have questions, please call your primary care clinic.  If you do not have a primary care provider, please call [phone] and they will assist you.        Care EveryWhere ID     This is your Care EveryWhere ID. This could be used by other organizations to  "access your [facility] medical records  [id number]        Your Vitals Were     Pulse Temperature Respirations Height Last Period BMI (Body Mass Index)    80 98.2  F (36.8  C) (Temporal) 16 5' 6.5\" (1.689 m) 02/26/2017 22.35 kg/m2       Blood Pressure from Last 3 Encounters:   02/27/17 98/68   02/05/17 151/82   10/12/16 112/66    Weight from Last 3 Encounters:   02/27/17 140 lb 9.6 oz (63.8 kg)   02/05/17 139 lb (63 kg)   01/27/17 140 lb (63.5 kg)              Today, you had the following     No orders found for display         Today's Medication Changes          These changes are accurate as of: 2/27/17 11:15 AM.  If you have any questions, ask your nurse or doctor.               Start taking these medicines.        Dose/Directions    drospirenone-ethinyl estradiol 3-0.03 MG per tablet   Commonly known as:  ROBYN   Used for:  Encounter for surveillance of contraceptives   Started by:  Mignon Bell PA-C        Dose:  1 tablet   Take 1 tablet by mouth daily   Quantity:  28 tablet   Refills:  11         Stop taking these medicines if you haven't already. Please contact your care team if you have questions.     levonorgestrel-ethinyl estradiol 0.15-0.03 MG per tablet   Commonly known as:  SEASONALE   Stopped by:  Mignon Bell PA-C                Where to get your medicines      These medications were sent to [facility] [address] [facility] [address]  AT [facility] [address]  [address] [facility] [address]     Phone:  [phone]     drospirenone-ethinyl estradiol 3-0.03 MG per tablet                Primary Care Provider Office Phone # Fax #    NADINE Garcia [facility] [phone]       [facility] [address] [facility] [address]        Thank you!     Thank you for choosing [facility]  for your care. Our goal is always to provide you with excellent care. Hearing back from our patients is one way we can continue to " improve our services. Please take a few minutes to complete the written survey that you may receive in the mail after your visit with us. Thank you!             Your Updated Medication List - Protect others around you: Learn how to safely use, store and throw away your medicines at www.disposemymeds.org.          This list is accurate as of: 2/27/17 11:15 AM.  Always use your most recent med list.                   Brand Name Dispense Instructions for use    drospirenone-ethinyl estradiol 3-0.03 MG per tablet    ROBYN    28 tablet    Take 1 tablet by mouth daily       FLUoxetine 10 MG capsule    PROzac    30 capsule    Take 1 capsule (10 mg) by mouth daily       FLUOXETINE HCL PO      Take 10 mg by mouth

## 2017-02-27 NOTE — PATIENT INSTRUCTIONS
Birth Control: The Pill    Birth control pills contain hormones that help prevent pregnancy. The pills are prescribed by your health care provider. There are many types of birth control pills available. If you have side effects from one type of pill, tell your health care provider. He or she may be able to prescribe a pill that works better for you.  Pregnancy rates  Talk to your health care provider about the effectiveness of this birth control method.  Using the pill    Take one pill daily. Take it at around the same time each day.    Follow your health care provider s guidelines on when to start your first pack of pills. You may need to use another form of birth control for a week or more after you start.    Know what to do if you forget to take a pill. (Consult your health care provider or check the package.) If you miss more than one pill, you may need to use a backup method of birth control for a week or more.  Pros    Low pregnancy rate.    No interruption to sex.    Easy to use.    Can help make periods more regular.    May lower your risk of ovarian cysts and certain cancers.    May decrease menstrual cramps, menstrual flow, and acne.  Cons    Does not protect against sexually transmitted infection (STIs).    Requires taking a pill on time each day.    May not work as well when taken with certain other medications. Check with your pharmacist.    May cause side effects such as nausea, irregular bleeding, headaches, breast tenderness, fatigue, or mood changes. These often go away within 3 months.    May increase the risk of blood clots, heart attack, and stroke.  The pill may not be for you if:    You are a smoker and over age 35.    You have high blood pressure or gallbladder, liver, cerebrovascular or heart disease.    You have diabetes, migraines, blood clot in the vein or artery, lupus, depression, certain lipid disorders, or take medications that interfere with the pill.  In these cases, discuss the  risks with your health care provider.    6405-1878 [employer]. [address] [place] [address], [place] [address]. All rights reserved. This information is not intended as a substitute for professional medical care. Always follow your healthcare professional's instructions.

## 2017-02-28 ASSESSMENT — PATIENT HEALTH QUESTIONNAIRE - PHQ9: SUM OF ALL RESPONSES TO PHQ QUESTIONS 1-9: 10

## 2017-02-28 ASSESSMENT — ANXIETY QUESTIONNAIRES: GAD7 TOTAL SCORE: 8

## 2017-04-17 NOTE — PROGRESS NOTES
SUBJECTIVE:                                                    Zuleyka Leslie is a 27 year old female who presents to clinic today for the following health issues:      Depression Followup-- she was in the ER on feb 5 for suicidal thoughts.  She was started on fluoxetine 10mg,  She was to follow up but did not do so.  She is here today because she would like to restart meds.  The fluoxetine was helpful to some extent, moods were more stable and did not have suicidal thoughts any more but gave her loose stools. The loose stools resolved once she was out of the medications.  She has been off meds now for 1 month.  She would like to restart something.  Has not been on other meds ,  She does have a counseling appt coming up in the next few weeks at [facility] .  She is looking forward to starting this process.  No current suicidal plans or intent just occasional thoughts that pop into her head about death.  She would really like this to stop    Status since last visit: same     See PHQ-9 for current symptoms.  Other associated symptoms: issues staying happy where she is sad for days    Complicating factors:   Significant life event:  No   Current substance abuse:  None  Anxiety or Panic symptoms:  Yes-  Scared to do normal things    PHQ-9  English PHQ-9   Any Language            Amount of exercise or physical activity: at work only about 8-9 hours daily    Problems taking medications regularly: No    Medication side effects: none    Diet: regular (no restrictions)          Problem list and histories reviewed & adjusted, as indicated.  Additional history: as documented    BP Readings from Last 3 Encounters:   04/19/17 104/64   02/27/17 98/68   02/05/17 151/82    Wt Readings from Last 3 Encounters:   04/19/17 140 lb (63.5 kg)   02/27/17 140 lb 9.6 oz (63.8 kg)   02/05/17 139 lb (63 kg)                  Labs reviewed in EPIC    Reviewed and updated as needed this visit by clinical staff       Reviewed and  "updated as needed this visit by Provider         ROS:      OBJECTIVE:                                                    /64  Pulse 80  Temp 97.8  F (36.6  C) (Oral)  Resp 12  Ht 5' 6.5\" (1.689 m)  Wt 140 lb (63.5 kg)  BMI 22.26 kg/m2  Body mass index is 22.26 kg/(m^2).  GENERAL: healthy, alert and no distress  PSYCH: mentation appears normal, affect normal/bright    Diagnostic Test Results:  Results for orders placed or performed during the hospital encounter of 02/05/17   CBC with platelets differential   Result Value Ref Range    WBC 7.7 4.0 - 11.0 10e9/L    RBC Count 4.61 3.8 - 5.2 10e12/L    Hemoglobin 14.6 11.7 - 15.7 g/dL    Hematocrit 42.8 35.0 - 47.0 %    MCV 93 78 - 100 fl    MCH 31.7 26.5 - 33.0 pg    MCHC 34.1 31.5 - 36.5 g/dL    RDW 12.5 10.0 - 15.0 %    Platelet Count 318 150 - 450 10e9/L    Diff Method Automated Method     % Neutrophils 66.2 %    % Lymphocytes 23.0 %    % Monocytes 5.6 %    % Eosinophils 4.6 %    % Basophils 0.5 %    % Immature Granulocytes 0.1 %    Absolute Neutrophil 5.1 1.6 - 8.3 10e9/L    Absolute Lymphocytes 1.8 0.8 - 5.3 10e9/L    Absolute Monocytes 0.4 0.0 - 1.3 10e9/L    Absolute Eosinophils 0.4 0.0 - 0.7 10e9/L    Absolute Basophils 0.0 0.0 - 0.2 10e9/L    Abs Immature Granulocytes 0.0 0 - 0.4 10e9/L   Salicylate level   Result Value Ref Range    Salicylate Level  mg/dL     <2  Therapeutic:        <20   Anti inflammatory:  15-30     Acetaminophen level   Result Value Ref Range    Acetaminophen Level <2  Therapeutic range: 10-20 mg/L   mg/L   Comprehensive metabolic panel   Result Value Ref Range    Sodium 142 133 - 144 mmol/L    Potassium 3.4 3.4 - 5.3 mmol/L    Chloride 107 94 - 109 mmol/L    Carbon Dioxide 26 20 - 32 mmol/L    Anion Gap 9 3 - 14 mmol/L    Glucose 84 70 - 99 mg/dL    Urea Nitrogen 13 7 - 30 mg/dL    Creatinine 0.69 0.52 - 1.04 mg/dL    GFR Estimate >90  Non  GFR Calc   >60 mL/min/1.7m2    GFR Estimate If Black >90  African " American GFR Calc   >60 mL/min/1.7m2    Calcium 8.3 (L) 8.5 - 10.1 mg/dL    Bilirubin Total 0.4 0.2 - 1.3 mg/dL    Albumin 3.9 3.4 - 5.0 g/dL    Protein Total 7.3 6.8 - 8.8 g/dL    Alkaline Phosphatase 46 40 - 150 U/L    ALT 20 0 - 50 U/L    AST 11 0 - 45 U/L   Ethanol level   Result Value Ref Range    Ethanol g/dL <0.01 <0.01 g/dL   HCG quantitative pregnancy (blood)   Result Value Ref Range    HCG Quantitative Serum <1 IU/L        ASSESSMENT/PLAN:                                                            1. Moderate single current episode of major depressive disorder (H)  Discussed use of medication, common side effects, how medication works and the fact that improvement in anticipated in 4-6 weeks.   She will do counseling a planned and alert me to any side effects or issues with taking the  meds   - escitalopram (LEXAPRO) 10 MG tablet; Take 1 tablet (10 mg) by mouth daily  Dispense: 30 tablet; Refill: 1    2. Anxiety  As above  - escitalopram (LEXAPRO) 10 MG tablet; Take 1 tablet (10 mg) by mouth daily  Dispense: 30 tablet; Refill: 1    Ov in 1 month    Nubia Conde PA-C  [facility]  Electronically signed by Nubia Conde PA-C

## 2017-04-19 ENCOUNTER — OFFICE VISIT (OUTPATIENT)
Dept: FAMILY MEDICINE | Facility: OTHER | Age: 27
End: 2017-04-19
Payer: COMMERCIAL

## 2017-04-19 VITALS
DIASTOLIC BLOOD PRESSURE: 64 MMHG | RESPIRATION RATE: 12 BRPM | HEART RATE: 80 BPM | WEIGHT: 140 LBS | SYSTOLIC BLOOD PRESSURE: 104 MMHG | HEIGHT: 67 IN | BODY MASS INDEX: 21.97 KG/M2 | TEMPERATURE: 97.8 F

## 2017-04-19 DIAGNOSIS — F41.9 ANXIETY: ICD-10-CM

## 2017-04-19 DIAGNOSIS — F32.1 MODERATE SINGLE CURRENT EPISODE OF MAJOR DEPRESSIVE DISORDER (H): Primary | ICD-10-CM

## 2017-04-19 PROCEDURE — 99213 OFFICE O/P EST LOW 20 MIN: CPT | Performed by: PHYSICIAN ASSISTANT

## 2017-04-19 RX ORDER — FLUOXETINE 10 MG/1
10 CAPSULE ORAL DAILY
Qty: 30 CAPSULE | Refills: 1 | Status: CANCELLED | OUTPATIENT
Start: 2017-04-19

## 2017-04-19 RX ORDER — ESCITALOPRAM OXALATE 10 MG/1
10 TABLET ORAL DAILY
Qty: 30 TABLET | Refills: 1 | Status: SHIPPED | OUTPATIENT
Start: 2017-04-19 | End: 2017-05-15

## 2017-04-19 ASSESSMENT — PATIENT HEALTH QUESTIONNAIRE - PHQ9: 5. POOR APPETITE OR OVEREATING: MORE THAN HALF THE DAYS

## 2017-04-19 ASSESSMENT — ANXIETY QUESTIONNAIRES
5. BEING SO RESTLESS THAT IT IS HARD TO SIT STILL: SEVERAL DAYS
3. WORRYING TOO MUCH ABOUT DIFFERENT THINGS: MORE THAN HALF THE DAYS
6. BECOMING EASILY ANNOYED OR IRRITABLE: SEVERAL DAYS
7. FEELING AFRAID AS IF SOMETHING AWFUL MIGHT HAPPEN: NOT AT ALL
IF YOU CHECKED OFF ANY PROBLEMS ON THIS QUESTIONNAIRE, HOW DIFFICULT HAVE THESE PROBLEMS MADE IT FOR YOU TO DO YOUR WORK, TAKE CARE OF THINGS AT HOME, OR GET ALONG WITH OTHER PEOPLE: SOMEWHAT DIFFICULT
2. NOT BEING ABLE TO STOP OR CONTROL WORRYING: SEVERAL DAYS
GAD7 TOTAL SCORE: 8
1. FEELING NERVOUS, ANXIOUS, OR ON EDGE: SEVERAL DAYS

## 2017-04-19 ASSESSMENT — PAIN SCALES - GENERAL: PAINLEVEL: NO PAIN (0)

## 2017-04-19 NOTE — NURSING NOTE
"Chief Complaint   Patient presents with     Depression     Panel Management     HPV, Pap, phq, dyan       Initial /64  Pulse 80  Temp 97.8  F (36.6  C) (Oral)  Resp 12  Ht 5' 6.5\" (1.689 m)  Wt 140 lb (63.5 kg)  BMI 22.26 kg/m2 Estimated body mass index is 22.26 kg/(m^2) as calculated from the following:    Height as of this encounter: 5' 6.5\" (1.689 m).    Weight as of this encounter: 140 lb (63.5 kg).  Medication Reconciliation: complete     Arin Betancur CMA (AAMA)      "

## 2017-04-19 NOTE — MR AVS SNAPSHOT
After Visit Summary   4/19/2017    Zuleyka Leslie    MRN: [id number]           Patient Information     Date Of Birth          1990        Visit Information        Provider Department      4/19/2017 4:00 PM Nubia Conde PA-C [facility]        Today's Diagnoses     Moderate single current episode of major depressive disorder (H)    -  1    Anxiety           Follow-ups after your visit        Your next 10 appointments already scheduled     May 15, 2017 11:15 AM CDT   Office Visit with Nubia Conde PA-C   [facility]    [address] [facility] [address] [phone]           Bring a current list of meds and any records pertaining to this visit.  For Physicals, please bring immunization records and any forms needing to be filled out.  Please arrive 10 minutes early to complete paperwork.              Who to contact     If you have questions or need follow up information about today's clinic visit or your schedule please contact [facility] directly at [phone].  Normal or non-critical lab and imaging results will be communicated to you by [employer], letter or phone within 4 business days after the clinic has received the results. If you do not hear from us within 7 days, please contact the clinic through sifon[employer] or phone. If you have a critical or abnormal lab result, we will notify you by phone as soon as possible.  Submit refill requests through [employer] or call your pharmacy and they will forward the refill request to us. Please allow 3 business days for your refill to be completed.          Additional Information About Your Visit        MyChart Information     [employer] gives you secure access to your electronic health record. If you see a primary care provider, you can also send messages to your care team and make appointments. If you have questions, please call your primary care clinic.  If you do  "not have a primary care provider, please call [phone] and they will assist you.        Care EveryWhere ID     This is your Care EveryWhere ID. This could be used by other organizations to access your [place] medical records  [id number]        Your Vitals Were     Pulse Temperature Respirations Height BMI (Body Mass Index)       80 97.8  F (36.6  C) (Oral) 12 5' 6.5\" (1.689 m) 22.26 kg/m2        Blood Pressure from Last 3 Encounters:   04/19/17 104/64   02/27/17 98/68   02/05/17 151/82    Weight from Last 3 Encounters:   04/19/17 140 lb (63.5 kg)   02/27/17 140 lb 9.6 oz (63.8 kg)   02/05/17 139 lb (63 kg)              Today, you had the following     No orders found for display         Today's Medication Changes          These changes are accurate as of: 4/19/17  4:54 PM.  If you have any questions, ask your nurse or doctor.               Start taking these medicines.        Dose/Directions    escitalopram 10 MG tablet   Commonly known as:  LEXAPRO   Used for:  Moderate single current episode of major depressive disorder (H), Anxiety   Started by:  Nubia Conde PA-C        Dose:  10 mg   Take 1 tablet (10 mg) by mouth daily   Quantity:  30 tablet   Refills:  1            Where to get your medicines      These medications were sent to [facility] - [address]   [address] [place] [address]     Phone:  [phone]     escitalopram 10 MG tablet                Primary Care Provider Office Phone # Fax #    Mine NADINE [facility] [phone]       [facility] [address] [facility] [address]        Thank you!     Thank you for choosing [facility]  for your care. Our goal is always to provide you with excellent care. Hearing back from our patients is one way we can continue to improve our services. Please take a few minutes to complete the written survey that you may receive in the mail after your visit with " us. Thank you!             Your Updated Medication List - Protect others around you: Learn how to safely use, store and throw away your medicines at www.disposemymeds.org.          This list is accurate as of: 4/19/17  4:54 PM.  Always use your most recent med list.                   Brand Name Dispense Instructions for use    drospirenone-ethinyl estradiol 3-0.03 MG per tablet    ROBYN    28 tablet    Take 1 tablet by mouth daily       escitalopram 10 MG tablet    LEXAPRO    30 tablet    Take 1 tablet (10 mg) by mouth daily       FLUoxetine 10 MG capsule    PROzac    30 capsule    Take 1 capsule (10 mg) by mouth daily

## 2017-04-21 ENCOUNTER — MYC MEDICAL ADVICE (OUTPATIENT)
Dept: FAMILY MEDICINE | Facility: OTHER | Age: 27
End: 2017-04-21

## 2017-04-21 ASSESSMENT — ANXIETY QUESTIONNAIRES: GAD7 TOTAL SCORE: 8

## 2017-04-21 ASSESSMENT — PATIENT HEALTH QUESTIONNAIRE - PHQ9: SUM OF ALL RESPONSES TO PHQ QUESTIONS 1-9: 15

## 2017-04-21 NOTE — LETTER
[facility]  [address] [address]  [phone]  Dept: [phone]      4/24/2017    Re: Zuleyka CONTRERAS Anuj      TO WHOM IT MAY CONCERN:    Zuleyka Leslie  was seen on April 19,2017.  Please excuse her from work today due to side effects of a new medication .    Cordially,        Nubia Conde PA-C  [facility]

## 2017-05-05 NOTE — PROGRESS NOTES
"  SUBJECTIVE:                                                    Zuleyka Leslie is a 27 year old female who presents to clinic today for the following health issues:  Patient is not taking prozac due to gi upset       Depression and Anxiety Follow-Up    Status since last visit: Improved     Other associated symptoms:\"bathroom regulation\"  She has some ab cramping and occasion diarrhea, though sometimes she has cramping and no diarrhea.   It is getting better since starting lexapro.  Has started counseling.  Feels that lexapro is really improving her mental health symptoms.  See PHQ 9 and OZZIE 7 questionnaires for symptoms.     Complicating factors:     Significant life event: No     Current substance abuse: None    PHQ-9 SCORE 10/12/2016 2/27/2017 4/19/2017   Total Score - - -   Total Score 7 10 15     OZZIE-7 SCORE 7/25/2014 2/27/2017 4/19/2017   Total Score 9 - -   Total Score - 8 8        PHQ-9  English      PHQ-9   Any Language     GAD7       Amount of exercise or physical activity: None - physical activity at work    Problems taking medications regularly: No    Medication side effects: abdominal cramping occasionally     Diet: regular (no restrictions)          Problem list and histories reviewed & adjusted, as indicated.  Additional history: as documented    BP Readings from Last 3 Encounters:   05/15/17 120/82   04/19/17 104/64   02/27/17 98/68    Wt Readings from Last 3 Encounters:   05/15/17 139 lb (63 kg)   04/19/17 140 lb (63.5 kg)   02/27/17 140 lb 9.6 oz (63.8 kg)                  Labs reviewed in EPIC    Reviewed and updated as needed this visit by clinical staff       Reviewed and updated as needed this visit by Provider         ROS:  As above    OBJECTIVE:                                                    /82 (BP Location: Right arm, Patient Position: Chair, Cuff Size: Adult Regular)  Pulse 68  Temp 98.4  F (36.9  C) (Temporal)  Wt 139 lb (63 kg)  BMI 22.1 kg/m2  Body mass index is 22.1 " kg/(m^2).  GENERAL: healthy, alert and no distress  PSYCH: mentation appears normal, affect normal/bright    Diagnostic Test Results:  none      ASSESSMENT/PLAN:                                                            1. Anxiety  Much improved, continue counseling, continue current medication may try 5mg to see if SE improve and symptoms remain well treated, if not return to 10 mg  If this is just not tolerable due to GI effects consider citalopram, if this causes GI symptoms will see if her symptoms are more true symptoms and not just a side effect    - escitalopram (LEXAPRO) 10 MG tablet; Take 1 tablet (10 mg) by mouth daily  Dispense: 30 tablet; Refill: 5    2. Major depression in complete remission (H)  As aobve  - escitalopram (LEXAPRO) 10 MG tablet; Take 1 tablet (10 mg) by mouth daily  Dispense: 30 tablet; Refill: 5    Recheck in 6 months if doing well sooner and will switch to citalopram     Nubia Conde PA-C  [facility]    Electronically signed by Nubia Conde PA-C

## 2017-05-15 ENCOUNTER — OFFICE VISIT (OUTPATIENT)
Dept: FAMILY MEDICINE | Facility: OTHER | Age: 27
End: 2017-05-15
Payer: COMMERCIAL

## 2017-05-15 VITALS
DIASTOLIC BLOOD PRESSURE: 82 MMHG | SYSTOLIC BLOOD PRESSURE: 120 MMHG | WEIGHT: 139 LBS | HEART RATE: 68 BPM | BODY MASS INDEX: 22.1 KG/M2 | TEMPERATURE: 98.4 F

## 2017-05-15 DIAGNOSIS — F32.5 MAJOR DEPRESSION IN COMPLETE REMISSION (H): Primary | ICD-10-CM

## 2017-05-15 DIAGNOSIS — F41.9 ANXIETY: ICD-10-CM

## 2017-05-15 PROBLEM — F32.1 MODERATE SINGLE CURRENT EPISODE OF MAJOR DEPRESSIVE DISORDER (H): Status: RESOLVED | Noted: 2017-04-19 | Resolved: 2017-05-15

## 2017-05-15 PROCEDURE — 99213 OFFICE O/P EST LOW 20 MIN: CPT | Performed by: PHYSICIAN ASSISTANT

## 2017-05-15 RX ORDER — ESCITALOPRAM OXALATE 10 MG/1
10 TABLET ORAL DAILY
Qty: 30 TABLET | Refills: 5 | Status: SHIPPED | OUTPATIENT
Start: 2017-05-15 | End: 2017-05-25 | Stop reason: SINTOL

## 2017-05-15 ASSESSMENT — ANXIETY QUESTIONNAIRES
GAD7 TOTAL SCORE: 0
2. NOT BEING ABLE TO STOP OR CONTROL WORRYING: NOT AT ALL
5. BEING SO RESTLESS THAT IT IS HARD TO SIT STILL: NOT AT ALL
6. BECOMING EASILY ANNOYED OR IRRITABLE: NOT AT ALL
7. FEELING AFRAID AS IF SOMETHING AWFUL MIGHT HAPPEN: NOT AT ALL
1. FEELING NERVOUS, ANXIOUS, OR ON EDGE: NOT AT ALL
3. WORRYING TOO MUCH ABOUT DIFFERENT THINGS: NOT AT ALL

## 2017-05-15 ASSESSMENT — PATIENT HEALTH QUESTIONNAIRE - PHQ9: 5. POOR APPETITE OR OVEREATING: NOT AT ALL

## 2017-05-15 NOTE — MR AVS SNAPSHOT
After Visit Summary   5/15/2017    Zuleyka Leslie    MRN: [id number]           Patient Information     Date Of Birth          1990        Visit Information        Provider Department      5/15/2017 11:15 AM Nubia Conde PA-C [facility]        Today's Diagnoses     Major depression in complete remission (H)    -  1    Anxiety           Follow-ups after your visit        Your next 10 appointments already scheduled     Jun 19, 2017 11:30 AM CDT   PHYSICAL with Nubia Conde PA-C   [facility] ([facility])    [address] [facility] [address] [phone]              Who to contact     If you have questions or need follow up information about today's clinic visit or your schedule please contact [facility] directly at [phone].  Normal or non-critical lab and imaging results will be communicated to you by MyChart, letter or phone within 4 business days after the clinic has received the results. If you do not hear from us within 7 days, please contact the clinic through MyChart or phone. If you have a critical or abnormal lab result, we will notify you by phone as soon as possible.  Submit refill requests through [employer] or call your pharmacy and they will forward the refill request to us. Please allow 3 business days for your refill to be completed.          Additional Information About Your Visit        MyChart Information     [employer] gives you secure access to your electronic health record. If you see a primary care provider, you can also send messages to your care team and make appointments. If you have questions, please call your primary care clinic.  If you do not have a primary care provider, please call [phone] and they will assist you.        Care EveryWhere ID     This is your Care EveryWhere ID. This could be used by other organizations to access your [place] medical records  [id number]         Your Vitals Were     Pulse Temperature BMI (Body Mass Index)             68 98.4  F (36.9  C) (Temporal) 22.1 kg/m2          Blood Pressure from Last 3 Encounters:   05/15/17 120/82   04/19/17 104/64   02/27/17 98/68    Weight from Last 3 Encounters:   05/15/17 139 lb (63 kg)   04/19/17 140 lb (63.5 kg)   02/27/17 140 lb 9.6 oz (63.8 kg)              Today, you had the following     No orders found for display         Today's Medication Changes          These changes are accurate as of: 5/15/17  1:30 PM.  If you have any questions, ask your nurse or doctor.               Stop taking these medicines if you haven't already. Please contact your care team if you have questions.     FLUoxetine 10 MG capsule   Commonly known as:  PROzac   Stopped by:  Nubia Conde PA-C                Where to get your medicines      These medications were sent to [facility] [address]   [address] [place] [address]     Phone:  [phone]     escitalopram 10 MG tablet                Primary Care Provider Office Phone # Fax #    Nubia Conde PA-C [phone]       [facility] [address] [place] [address]        Thank you!     Thank you for choosing [facility]  for your care. Our goal is always to provide you with excellent care. Hearing back from our patients is one way we can continue to improve our services. Please take a few minutes to complete the written survey that you may receive in the mail after your visit with us. Thank you!             Your Updated Medication List - Protect others around you: Learn how to safely use, store and throw away your medicines at www.disposemymeds.org.          This list is accurate as of: 5/15/17  1:30 PM.  Always use your most recent med list.                   Brand Name Dispense Instructions for use    drospirenone-ethinyl estradiol 3-0.03 MG per tablet    ROBYN    28 tablet    Take 1  tablet by mouth daily       escitalopram 10 MG tablet    LEXAPRO    30 tablet    Take 1 tablet (10 mg) by mouth daily

## 2017-05-15 NOTE — NURSING NOTE
"Chief Complaint   Patient presents with     Depression     Anxiety     Panel Management     hpv, pap, phq       Initial /82 (BP Location: Right arm, Patient Position: Chair, Cuff Size: Adult Regular)  Pulse 68  Temp 98.4  F (36.9  C) (Temporal)  Wt 139 lb (63 kg)  BMI 22.1 kg/m2 Estimated body mass index is 22.1 kg/(m^2) as calculated from the following:    Height as of 4/19/17: 5' 6.5\" (1.689 m).    Weight as of this encounter: 139 lb (63 kg).  Medication Reconciliation: complete     Palmira Min, CHERELLE      "

## 2017-05-16 ASSESSMENT — ANXIETY QUESTIONNAIRES: GAD7 TOTAL SCORE: 0

## 2017-05-16 ASSESSMENT — PATIENT HEALTH QUESTIONNAIRE - PHQ9: SUM OF ALL RESPONSES TO PHQ QUESTIONS 1-9: 3

## 2017-05-25 ENCOUNTER — MYC MEDICAL ADVICE (OUTPATIENT)
Dept: FAMILY MEDICINE | Facility: OTHER | Age: 27
End: 2017-05-25

## 2017-05-25 DIAGNOSIS — F41.9 ANXIETY: ICD-10-CM

## 2017-05-25 DIAGNOSIS — F32.5 MAJOR DEPRESSION IN COMPLETE REMISSION (H): Primary | ICD-10-CM

## 2017-05-25 RX ORDER — CITALOPRAM HYDROBROMIDE 10 MG/1
10 TABLET ORAL DAILY
Qty: 30 TABLET | Refills: 0 | Status: SHIPPED | OUTPATIENT
Start: 2017-05-25 | End: 2017-06-19

## 2017-05-25 NOTE — LETTER
[facility]  [address] [address] [facility] [address] [address] [phone]  Dept: [phone]      5/26/2017    Re: Zuleyka Leslie      TO WHOM IT MAY CONCERN:    Zuleyka Leslie  Is having a change to her medications. Please excuse her  From work if needed on 5-26-17-5-29-17 as she adjusts to her new medication.     Cordially,        Nubia Conde PA-C  [facility]

## 2017-05-25 NOTE — TELEPHONE ENCOUNTER
Spoke with patient she understands the plan, and will  the citalopram tonight, she was told that she will need a note from Nubia excusing her from work for 5/26 through 5/29, patient states that when she switches to new medication she usually has a really hard time, and might miss some work.  At this time she did not want to switch her appointment, she said she will see how the BRAT diet and Citalopram work for her.  Thanks  Kristin Westfall RT (R)

## 2017-05-26 NOTE — TELEPHONE ENCOUNTER
OK to LM, MAKAYLA stating letter was done and is at  for her to . Will also send Novan message.    Arin Betancur CMA ([facility])
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
## 2017-06-19 ENCOUNTER — OFFICE VISIT (OUTPATIENT)
Dept: FAMILY MEDICINE | Facility: OTHER | Age: 27
End: 2017-06-19
Payer: COMMERCIAL

## 2017-06-19 VITALS
WEIGHT: 139 LBS | DIASTOLIC BLOOD PRESSURE: 60 MMHG | HEIGHT: 67 IN | BODY MASS INDEX: 21.82 KG/M2 | RESPIRATION RATE: 12 BRPM | HEART RATE: 66 BPM | SYSTOLIC BLOOD PRESSURE: 110 MMHG | TEMPERATURE: 98.6 F

## 2017-06-19 DIAGNOSIS — F41.9 ANXIETY: ICD-10-CM

## 2017-06-19 DIAGNOSIS — Z30.40 ENCOUNTER FOR SURVEILLANCE OF CONTRACEPTIVES: ICD-10-CM

## 2017-06-19 DIAGNOSIS — Z00.00 ENCOUNTER FOR ROUTINE ADULT HEALTH EXAMINATION WITHOUT ABNORMAL FINDINGS: ICD-10-CM

## 2017-06-19 DIAGNOSIS — Z12.4 SCREENING FOR MALIGNANT NEOPLASM OF CERVIX: ICD-10-CM

## 2017-06-19 DIAGNOSIS — F32.5 MAJOR DEPRESSION IN COMPLETE REMISSION (H): ICD-10-CM

## 2017-06-19 DIAGNOSIS — Z11.3 SCREEN FOR STD (SEXUALLY TRANSMITTED DISEASE): Primary | ICD-10-CM

## 2017-06-19 DIAGNOSIS — D22.9 ATYPICAL MOLE: ICD-10-CM

## 2017-06-19 DIAGNOSIS — Z13.6 CARDIOVASCULAR SCREENING; LDL GOAL LESS THAN 160: ICD-10-CM

## 2017-06-19 PROCEDURE — G0145 SCR C/V CYTO,THINLAYER,RESCR: HCPCS | Performed by: PHYSICIAN ASSISTANT

## 2017-06-19 PROCEDURE — 99395 PREV VISIT EST AGE 18-39: CPT | Performed by: PHYSICIAN ASSISTANT

## 2017-06-19 PROCEDURE — 87491 CHLMYD TRACH DNA AMP PROBE: CPT | Performed by: PHYSICIAN ASSISTANT

## 2017-06-19 PROCEDURE — 87591 N.GONORRHOEAE DNA AMP PROB: CPT | Performed by: PHYSICIAN ASSISTANT

## 2017-06-19 RX ORDER — CITALOPRAM HYDROBROMIDE 10 MG/1
10 TABLET ORAL DAILY
Qty: 90 TABLET | Refills: 1 | Status: SHIPPED | OUTPATIENT
Start: 2017-06-19 | End: 2017-11-22

## 2017-06-19 RX ORDER — DROSPIRENONE AND ETHINYL ESTRADIOL 0.03MG-3MG
1 KIT ORAL DAILY
Qty: 84 TABLET | Refills: 3 | Status: SHIPPED | OUTPATIENT
Start: 2017-06-19 | End: 2018-02-07

## 2017-06-19 ASSESSMENT — ANXIETY QUESTIONNAIRES
GAD7 TOTAL SCORE: 1
7. FEELING AFRAID AS IF SOMETHING AWFUL MIGHT HAPPEN: NOT AT ALL
5. BEING SO RESTLESS THAT IT IS HARD TO SIT STILL: NOT AT ALL
3. WORRYING TOO MUCH ABOUT DIFFERENT THINGS: NOT AT ALL
6. BECOMING EASILY ANNOYED OR IRRITABLE: NOT AT ALL
1. FEELING NERVOUS, ANXIOUS, OR ON EDGE: SEVERAL DAYS
2. NOT BEING ABLE TO STOP OR CONTROL WORRYING: NOT AT ALL

## 2017-06-19 ASSESSMENT — PAIN SCALES - GENERAL: PAINLEVEL: NO PAIN (0)

## 2017-06-19 ASSESSMENT — PATIENT HEALTH QUESTIONNAIRE - PHQ9: 5. POOR APPETITE OR OVEREATING: NOT AT ALL

## 2017-06-19 NOTE — MR AVS SNAPSHOT
After Visit Summary   6/19/2017    Zuleyka Leslie    MRN: [id number]           Patient Information     Date Of Birth          1990        Visit Information        Provider Department      6/19/2017 11:30 AM Nubia Conde PA-C [facility]'s Diagnoses     Screen for STD (sexually transmitted disease)    -  1    Encounter for routine adult health examination without abnormal findings        Major depression in complete remission (H)        Anxiety        Encounter for surveillance of contraceptives        Screening for malignant neoplasm of cervix          Care Instructions      Preventive Health Recommendations  Female Ages 18 to 25     Yearly exam:     See your health care provider every year in order to  o Review health changes.   o Discuss preventive care.    o Review your medicines if your doctor has prescribed any.      You should be tested each year for STDs (sexually transmitted diseases).       After age 20, talk to your provider about how often you should have cholesterol testing.      Starting at age 21, get a Pap test every three years. If you have an abnormal result, your doctor may have you test more often.      If you are at risk for diabetes, you should have a diabetes test (fasting glucose).     Shots:     Get a flu shot each year.     Get a tetanus shot every 10 years.     Consider getting the shot (vaccine) that prevents cervical cancer (Gardasil).    Nutrition:     Eat at least 5 servings of fruits and vegetables each day.    Eat whole-grain bread, whole-wheat pasta and brown rice instead of white grains and rice.    Talk to your provider about Calcium and Vitamin D.     Lifestyle    Exercise at least 150 minutes a week each week (30 minutes a day, 5 days a week). This will help you control your weight and prevent disease.    Limit alcohol to one drink per day.    No smoking.     Wear sunscreen to prevent skin cancer.    See your dentist every  "six months for an exam and cleaning.          Follow-ups after your visit        Who to contact     If you have questions or need follow up information about today's clinic visit or your schedule please contact [facility] directly at [phone].  Normal or non-critical lab and imaging results will be communicated to you by [employer], letter or phone within 4 business days after the clinic has received the results. If you do not hear from us within 7 days, please contact the clinic through [employer] or phone. If you have a critical or abnormal lab result, we will notify you by phone as soon as possible.  Submit refill requests through [employer] or call your pharmacy and they will forward the refill request to us. Please allow 3 business days for your refill to be completed.          Additional Information About Your Visit        [employer] Information     [employer] gives you secure access to your electronic health record. If you see a primary care provider, you can also send messages to your care team and make appointments. If you have questions, please call your primary care clinic.  If you do not have a primary care provider, please call [phone] and they will assist you.        Care EveryWhere ID     This is your Care EveryWhere ID. This could be used by other organizations to access your [facility] medical records  [id number]        Your Vitals Were     Pulse Temperature Respirations Height Last Period BMI (Body Mass Index)    66 98.6  F (37  C) (Oral) 12 5' 6.5\" (1.689 m) 06/14/2017 (Exact Date) 22.1 kg/m2       Blood Pressure from Last 3 Encounters:   06/19/17 110/60   05/15/17 120/82   04/19/17 104/64    Weight from Last 3 Encounters:   06/19/17 139 lb (63 kg)   05/15/17 139 lb (63 kg)   04/19/17 140 lb (63.5 kg)              We Performed the Following     CHLAMYDIA TRACHOMATIS PCR     NEISSERIA GONORRHOEA PCR     Pap imaged thin layer screen reflex to HPV if ASCUS - recommend age 25 - 29        "   Where to get your medicines      These medications were sent to [id number] - [place], [place] - [address] AT [facility] [address]  [address], [place] [address]     Phone:  [phone]     citalopram 10 MG tablet    drospirenone-ethinyl estradiol 3-0.03 MG per tablet          Primary Care Provider Office Phone # Fax #    Nubia Conde PA-C [phone]       [facility] [address] [place] [address]        Thank you!     Thank you for choosing [facility]  for your care. Our goal is always to provide you with excellent care. Hearing back from our patients is one way we can continue to improve our services. Please take a few minutes to complete the written survey that you may receive in the mail after your visit with us. Thank you!             Your Updated Medication List - Protect others around you: Learn how to safely use, store and throw away your medicines at www.disposemymeds.org.          This list is accurate as of: 6/19/17 11:56 AM.  Always use your most recent med list.                   Brand Name Dispense Instructions for use    citalopram 10 MG tablet    celeXA    90 tablet    Take 1 tablet (10 mg) by mouth daily       drospirenone-ethinyl estradiol 3-0.03 MG per tablet    ROBYN    84 tablet    Take 1 tablet by mouth daily

## 2017-06-19 NOTE — NURSING NOTE
"Chief Complaint   Patient presents with     Physical     Panel Management     HPV, Chlamydia, Pap, phq, dyan       Initial /60 (BP Location: Right arm, Patient Position: Chair, Cuff Size: Adult Regular)  Pulse 66  Temp 98.6  F (37  C) (Oral)  Resp 12  Ht 5' 6.5\" (1.689 m)  Wt 139 lb (63 kg)  LMP 2017 (Exact Date)  BMI 22.1 kg/m2 Estimated body mass index is 22.1 kg/(m^2) as calculated from the following:    Height as of this encounter: 5' 6.5\" (1.689 m).    Weight as of this encounter: 139 lb (63 kg).  BP completed using cuff size: regular        The following HM Due: pap smear  Frank (13-24)      The following patient reported/Care Every where data was sent to:  P ABSTRACT QUALITY INITIATIVES [34132]  n/a     patient has appointment for today    Dipti Pina CMA  2017           "

## 2017-06-20 LAB
C TRACH DNA SPEC QL NAA+PROBE: NORMAL
N GONORRHOEA DNA SPEC QL NAA+PROBE: NORMAL
SPECIMEN SOURCE: NORMAL
SPECIMEN SOURCE: NORMAL

## 2017-06-20 ASSESSMENT — PATIENT HEALTH QUESTIONNAIRE - PHQ9: SUM OF ALL RESPONSES TO PHQ QUESTIONS 1-9: 3

## 2017-06-20 ASSESSMENT — ANXIETY QUESTIONNAIRES: GAD7 TOTAL SCORE: 1

## 2017-06-21 LAB
COPATH REPORT: NORMAL
PAP: NORMAL

## 2017-09-29 ENCOUNTER — TELEPHONE (OUTPATIENT)
Dept: FAMILY MEDICINE | Facility: OTHER | Age: 27
End: 2017-09-29

## 2017-09-29 NOTE — TELEPHONE ENCOUNTER
Summary:    Patient is due/failing the following:   PHQ9    Action needed:   Patient needs to do PHQ9.    Type of outreach:    Sent QFPay message.    Questions for provider review:    None                                                                                                                                    Lidia Nicholas       Chart routed to Care Team .      Panel Management Review      Patient has the following on her problem list:     Depression / Dysthymia review    Measure:  Needs PHQ-9 score of 4 or less during index window.  Administer PHQ-9 and if score is 5 or more, send encounter to provider for next steps.        PHQ-9 SCORE 4/19/2017 5/15/2017 6/19/2017   Total Score - - -   Total Score 15 3 3       If PHQ-9 recheck is 5 or more, route to provider for next steps.    Patient is due for:  PHQ9        Composite cancer screening  Chart review shows that this patient is due/due soon for the following None

## 2017-11-21 ENCOUNTER — TELEPHONE (OUTPATIENT)
Dept: FAMILY MEDICINE | Facility: OTHER | Age: 27
End: 2017-11-21

## 2017-11-21 ENCOUNTER — MYC MEDICAL ADVICE (OUTPATIENT)
Dept: FAMILY MEDICINE | Facility: OTHER | Age: 27
End: 2017-11-21

## 2017-11-21 NOTE — TELEPHONE ENCOUNTER
See below mychart request. Can someone work her in? No oepnings this week.       Appointment Request From: Zuleyka Leslie           With Provider: Nubia Conde PA-C [-Primary Care Physician-]          Preferred Date Range: From 11/24/2017 To 11/27/2017          Preferred Times: Any          Reason for visit: Request an Appointment          Comments:     Medication/Depression review. Potential new drug due to recent increase in anxiety and anxiety-induced depression.

## 2017-11-22 ENCOUNTER — E-VISIT (OUTPATIENT)
Dept: FAMILY MEDICINE | Facility: OTHER | Age: 27
End: 2017-11-22
Payer: COMMERCIAL

## 2017-11-22 DIAGNOSIS — F32.5 MAJOR DEPRESSION IN COMPLETE REMISSION (H): ICD-10-CM

## 2017-11-22 DIAGNOSIS — F41.9 ANXIETY: ICD-10-CM

## 2017-11-22 PROCEDURE — 98969 ZZC NONPHYSICIAN ONLINE ASSESSMENT AND MANAGEMENT: CPT | Performed by: PHYSICIAN ASSISTANT

## 2017-11-22 RX ORDER — CITALOPRAM HYDROBROMIDE 10 MG/1
20 TABLET ORAL DAILY
Qty: 60 TABLET | Refills: 1 | Status: SHIPPED | OUTPATIENT
Start: 2017-11-22 | End: 2018-02-07

## 2017-11-22 ASSESSMENT — ANXIETY QUESTIONNAIRES
GAD7 TOTAL SCORE: 8
2. NOT BEING ABLE TO STOP OR CONTROL WORRYING: SEVERAL DAYS
5. BEING SO RESTLESS THAT IT IS HARD TO SIT STILL: SEVERAL DAYS
3. WORRYING TOO MUCH ABOUT DIFFERENT THINGS: SEVERAL DAYS
7. FEELING AFRAID AS IF SOMETHING AWFUL MIGHT HAPPEN: SEVERAL DAYS
GAD7 TOTAL SCORE: 8
1. FEELING NERVOUS, ANXIOUS, OR ON EDGE: MORE THAN HALF THE DAYS
7. FEELING AFRAID AS IF SOMETHING AWFUL MIGHT HAPPEN: SEVERAL DAYS
4. TROUBLE RELAXING: SEVERAL DAYS
6. BECOMING EASILY ANNOYED OR IRRITABLE: SEVERAL DAYS
GAD7 TOTAL SCORE: 8

## 2017-11-22 ASSESSMENT — PATIENT HEALTH QUESTIONNAIRE - PHQ9
SUM OF ALL RESPONSES TO PHQ QUESTIONS 1-9: 13
SUM OF ALL RESPONSES TO PHQ QUESTIONS 1-9: 13
10. IF YOU CHECKED OFF ANY PROBLEMS, HOW DIFFICULT HAVE THESE PROBLEMS MADE IT FOR YOU TO DO YOUR WORK, TAKE CARE OF THINGS AT HOME, OR GET ALONG WITH OTHER PEOPLE: VERY DIFFICULT

## 2017-11-23 ASSESSMENT — PATIENT HEALTH QUESTIONNAIRE - PHQ9: SUM OF ALL RESPONSES TO PHQ QUESTIONS 1-9: 13

## 2017-11-23 ASSESSMENT — ANXIETY QUESTIONNAIRES: GAD7 TOTAL SCORE: 8

## 2017-11-28 ENCOUNTER — MYC MEDICAL ADVICE (OUTPATIENT)
Dept: FAMILY MEDICINE | Facility: OTHER | Age: 27
End: 2017-11-28

## 2017-11-28 NOTE — PROGRESS NOTES
SUBJECTIVE:                                                    Zuleyka Leslie is a 27 year old female who presents to clinic today for the following health issues:      HPI    Depression and Anxiety Follow-Up    Status since last visit: Worsened, anxiety symptoms are the worst right now     Other associated symptoms:anxious, skipping school, stressed, had an episode of chest pain with face tingling in the heat of panic about going to school     Complicating factors:     Significant life event: Yes-  Started college classes, boyfriend got a dui      Current substance abuse: Alcohol (1-2 per night) and Cannabis (a hit or two per day using a onie)  Has been having worsening symptoms of anxiety is taking a full load now. She switched major from science to art.   Has social anxiety worried about walking into class late.   [employer] 3 days per week mostly day hours.  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   Going to bed usually between 9-11.   Home life has been having to wake up early to give her boyfriend a ride to work. Has not been able to sleep well.        PHQ-9 Score and MyChart F/U Questions 6/19/2017 9/30/2017 11/22/2017   Total Score 3 4 13   Q9: Suicide Ideation Not at all Not at all Several days   F/U: Thoughts of suicide or self harm? - - Yes   F/U: Plan or intention for self harm? - - No   F/U: Acted on thoughts? - - No   F/U: Safety concerns for self or others? - - No     OZZIE-7 SCORE 5/15/2017 6/19/2017 11/22/2017   Total Score - - -   Total Score - - 8 (mild anxiety)   Total Score 0 1 8     In the past two weeks have you had thoughts of suicide or self-harm?  No.    Do you have concerns about your personal safety or the safety of others?   No    PHQ-9  English  PHQ-9   Any Language  GAD7  Suicide Assessment Five-step Evaluation and Treatment (SAFE-T)        Problem list and histories reviewed & adjusted, as indicated.  Additional history: as  documented    BP Readings from Last 3 Encounters:   11/29/17 100/60   06/19/17 110/60   05/15/17 120/82    Wt Readings from Last 3 Encounters:   11/29/17 140 lb (63.5 kg)   06/19/17 139 lb (63 kg)   05/15/17 139 lb (63 kg)         Labs reviewed in EPIC      ROS:  Constitutional, HEENT, cardiovascular, pulmonary, gi and gu systems are negative, except as otherwise noted.      OBJECTIVE:   /60 (BP Location: Left arm, Patient Position: Chair, Cuff Size: Adult Regular)  Pulse 70  Temp 98.7  F (37.1  C) (Oral)  Resp 16  Wt 140 lb (63.5 kg)  BMI 22.26 kg/m2  Body mass index is 22.26 kg/(m^2).  GENERAL: healthy, alert and no distress  NECK: no adenopathy, no asymmetry, masses, or scars and thyroid normal to palpation  RESP: lungs clear to auscultation - no rales, rhonchi or wheezes  CV: regular rate and rhythm, normal S1 S2, no S3 or S4, no murmur, click or rub, no peripheral edema and peripheral pulses strong  MS: no gross musculoskeletal defects noted, no edema  SKIN: no suspicious lesions or rashes  PSYCH: mentation appears normal, affect normal/bright, tearful, anxious, judgement and insight intact and appearance well groomed    Diagnostic Test Results:  none     ASSESSMENT/PLAN:     1. Anxiety  Recommend adding BuSpar at 5 mg per day for 5-7 days and will have her increase to 10 mg daily and stay at that dose. Discussed taking Celexa in the evening as she states this has been making her sleepy during the day.  Also discussed using substances such as alcohol and cannabis and affect that this can cause on her depression and anxiety actually worsening her symptoms and making it more difficult for her to sleep. She states that she has not been drinking alcohol much lately and feels that this is just an added expense anyway and will plan on cutting this out. Also discussed discontinuing using cannabis as this can also worsen her symptoms of anxiety and paranoia.  - busPIRone (BUSPAR) 5 MG tablet; Take 2 tablets  (10 mg) by mouth daily Start taking one tab daily for 5-7 days then increase to two tabs daily.  Dispense: 90 tablet; Refill: 1    2. Panic attack  Discussed side effects of propranolol including decreased heart rate and blood pressure. Discussed starting this medication by taking it at home and monitoring side effects. If she tolerates this medication well may be used for times of anxiety which seemed to be only when she is going to classes.  - propranolol (INDERAL) 40 MG tablet; Please take one tab 30-60 minutes prior to anxiety state.  Dispense: 20 tablet; Refill: 0    Discussed therapy patient states that she has done this in the past and does not feel that she can afford it at this time and declined referral.    Home care instructions were reviewed with the patient. The risks, benefits and treatment options of prescribed medications or other treatments have been discussed with the patient. The patient verbalized their understanding and should call or follow up if no improvement or if they develop further problems.    Patient Instructions   Please follow up in office or phone in 6 weeks or sooner if symptoms worsen.     Thank you  NADINE Pacheco CNP [facility]

## 2017-11-29 ENCOUNTER — OFFICE VISIT (OUTPATIENT)
Dept: FAMILY MEDICINE | Facility: OTHER | Age: 27
End: 2017-11-29
Payer: COMMERCIAL

## 2017-11-29 VITALS
WEIGHT: 140 LBS | HEART RATE: 70 BPM | BODY MASS INDEX: 22.26 KG/M2 | DIASTOLIC BLOOD PRESSURE: 60 MMHG | TEMPERATURE: 98.7 F | SYSTOLIC BLOOD PRESSURE: 100 MMHG | RESPIRATION RATE: 16 BRPM

## 2017-11-29 DIAGNOSIS — F41.9 ANXIETY: Primary | ICD-10-CM

## 2017-11-29 DIAGNOSIS — F41.0 PANIC ATTACK: ICD-10-CM

## 2017-11-29 PROCEDURE — 99214 OFFICE O/P EST MOD 30 MIN: CPT | Performed by: NURSE PRACTITIONER

## 2017-11-29 RX ORDER — PROPRANOLOL HYDROCHLORIDE 40 MG/1
TABLET ORAL
Qty: 20 TABLET | Refills: 0 | Status: SHIPPED | OUTPATIENT
Start: 2017-11-29 | End: 2020-08-05

## 2017-11-29 RX ORDER — BUSPIRONE HYDROCHLORIDE 5 MG/1
10 TABLET ORAL DAILY
Qty: 90 TABLET | Refills: 1 | Status: SHIPPED | OUTPATIENT
Start: 2017-11-29 | End: 2018-02-07

## 2017-11-29 ASSESSMENT — ANXIETY QUESTIONNAIRES
2. NOT BEING ABLE TO STOP OR CONTROL WORRYING: MORE THAN HALF THE DAYS
IF YOU CHECKED OFF ANY PROBLEMS ON THIS QUESTIONNAIRE, HOW DIFFICULT HAVE THESE PROBLEMS MADE IT FOR YOU TO DO YOUR WORK, TAKE CARE OF THINGS AT HOME, OR GET ALONG WITH OTHER PEOPLE: EXTREMELY DIFFICULT
1. FEELING NERVOUS, ANXIOUS, OR ON EDGE: NEARLY EVERY DAY
3. WORRYING TOO MUCH ABOUT DIFFERENT THINGS: MORE THAN HALF THE DAYS
6. BECOMING EASILY ANNOYED OR IRRITABLE: NOT AT ALL
7. FEELING AFRAID AS IF SOMETHING AWFUL MIGHT HAPPEN: MORE THAN HALF THE DAYS
GAD7 TOTAL SCORE: 11
5. BEING SO RESTLESS THAT IT IS HARD TO SIT STILL: NOT AT ALL

## 2017-11-29 ASSESSMENT — PATIENT HEALTH QUESTIONNAIRE - PHQ9
5. POOR APPETITE OR OVEREATING: MORE THAN HALF THE DAYS
SUM OF ALL RESPONSES TO PHQ QUESTIONS 1-9: 15

## 2017-11-29 NOTE — NURSING NOTE
"Chief Complaint   Patient presents with     Fatigue     Panel Management     HPV, flu, DAP       Initial /60 (BP Location: Left arm, Patient Position: Chair, Cuff Size: Adult Regular)  Pulse 70  Temp 98.7  F (37.1  C) (Oral)  Resp 16  Wt 140 lb (63.5 kg)  BMI 22.26 kg/m2 Estimated body mass index is 22.26 kg/(m^2) as calculated from the following:    Height as of 6/19/17: 5' 6.5\" (1.689 m).    Weight as of this encounter: 140 lb (63.5 kg).  Medication Reconciliation: complete    "

## 2017-11-29 NOTE — TELEPHONE ENCOUNTER
Patient has appointment with Rahat today 11/29/2017  Closing encounter  Kristin Westfall RT (R)

## 2017-11-29 NOTE — PATIENT INSTRUCTIONS
Please follow up in office or phone in 6 weeks or sooner if symptoms worsen.     Thank you  Hiwot Giang CNP

## 2017-11-29 NOTE — LETTER
My Depression Action Plan  Name: Zuleyka Leslie   Date of Birth 1990  Date: 11/28/2017    My doctor: Nubia Conde   My clinic: [address]  [facility] [address] [phone]          GREEN    ZONE   Good Control    What it looks like:     Things are going generally well. You have normal up s and down s. You may even feel depressed from time to time, but bad moods usually last less than a day.   What you need to do:  1. Continue to care for yourself (see self care plan)  2. Check your depression survival kit and update it as needed  3. Follow your physician s recommendations including any medication.  4. Do not stop taking medication unless you consult with your physician first.           YELLOW         ZONE Getting Worse    What it looks like:     Depression is starting to interfere with your life.     It may be hard to get out of bed; you may be starting to isolate yourself from others.    Symptoms of depression are starting to last most all day and this has happened for several days.     You may have suicidal thoughts but they are not constant.   What you need to do:     1. Call your care team, your response to treatment will improve if you keep your care team informed of your progress. Yellow periods are signs an adjustment may need to be made.     2. Continue your self-care, even if you have to fake it!    3. Talk to someone in your support network    4. Open up your depression survival kit           RED    ZONE Medical Alert - Get Help    What it looks like:     Depression is seriously interfering with your life.     You may experience these or other symptoms: You can t get out of bed most days, can t work or engage in other necessary activities, you have trouble taking care of basic hygiene, or basic responsibilities, thoughts of suicide or death that will not go away, self-injurious behavior.     What you need to do:  1. Call your care team  and request a same-day appointment. If they are not available (weekends or after hours) call your local crisis line, emergency room or 911.      Electronically signed by: Amalia Mora, November 28, 2017    Depression Self Care Plan / Survival Kit    Self-Care for Depression  Here s the deal. Your body and mind are really not as separate as most people think.  What you do and think affects how you feel and how you feel influences what you do and think. This means if you do things that people who feel good do, it will help you feel better.  Sometimes this is all it takes.  There is also a place for medication and therapy depending on how severe your depression is, so be sure to consult with your medical provider and/ or Behavioral Health Consultant if your symptoms are worsening or not improving.     In order to better manage my stress, I will:    Exercise  Get some form of exercise, every day. This will help reduce pain and release endorphins, the  feel good  chemicals in your brain. This is almost as good as taking antidepressants!  This is not the same as joining a gym and then never going! (they count on that by the way ) It can be as simple as just going for a walk or doing some gardening, anything that will get you moving.      Hygiene   Maintain good hygiene (Get out of bed in the morning, Make your bed, Brush your teeth, Take a shower, and Get dressed like you were going to work, even if you are unemployed).  If your clothes don't fit try to get ones that do.    Diet  I will strive to eat foods that are good for me, drink plenty of water, and avoid excessive sugar, caffeine, alcohol, and other mood-altering substances.  Some foods that are helpful in depression are: complex carbohydrates, B vitamins, flaxseed, fish or fish oil, fresh fruits and vegetables.    Psychotherapy  I agree to participate in Individual Therapy (if recommended).    Medication  If prescribed medications, I agree to take them.   Missing doses can result in serious side effects.  I understand that drinking alcohol, or other illicit drug use, may cause potential side effects.  I will not stop my medication abruptly without first discussing it with my provider.    Staying Connected With Others  I will stay in touch with my friends, family members, and my primary care provider/team.    Use your imagination  Be creative.  We all have a creative side; it doesn t matter if it s oil painting, sand castles, or mud pies! This will also kick up the endorphins.    Witness Beauty  (AKA stop and smell the roses) Take a look outside, even in mid-winter. Notice colors, textures. Watch the squirrels and birds.     Service to others  Be of service to others.  There is always someone else in need.  By helping others we can  get out of ourselves  and remember the really important things.  This also provides opportunities for practicing all the other parts of the program.    Humor  Laugh and be silly!  Adjust your TV habits for less news and crime-drama and more comedy.    Control your stress  Try breathing deep, massage therapy, biofeedback, and meditation. Find time to relax each day.     My support system    Clinic Contact:  Phone number:    Contact 1:  Phone number:    Contact 2:  Phone number:    Presybeterian/:  Phone number:    Therapist:  Phone number:    Local crisis center:    Phone number:    Other community support:  Phone number:

## 2017-11-29 NOTE — MR AVS SNAPSHOT
After Visit Summary   11/29/2017    Zuleyka Leslie    MRN: [id number]           Patient Information     Date Of Birth          1990        Visit Information        Provider Department      11/29/2017 4:00 PM Hiwot Giang APRN CNP [facility]        Today's Diagnoses     Anxiety    -  1    Panic attack          Care Instructions    Please follow up in office or phone in 6 weeks or sooner if symptoms worsen.     Thank you  Hiwot Giang CNP            Follow-ups after your visit        Future tests that were ordered for you today     Open Future Orders        Priority Expected Expires Ordered    DEPRESSION ACTION PLAN (DAP) Routine  11/30/2017 11/29/2017            Who to contact     If you have questions or need follow up information about today's clinic visit or your schedule please contact [facility] directly at [phone].  Normal or non-critical lab and imaging results will be communicated to you by [employer], letter or phone within 4 business days after the clinic has received the results. If you do not hear from us within 7 days, please contact the clinic through [employer] or phone. If you have a critical or abnormal lab result, we will notify you by phone as soon as possible.  Submit refill requests through [employer] or call your pharmacy and they will forward the refill request to us. Please allow 3 business days for your refill to be completed.          Additional Information About Your Visit        MyChart Information     [employer] gives you secure access to your electronic health record. If you see a primary care provider, you can also send messages to your care team and make appointments. If you have questions, please call your primary care clinic.  If you do not have a primary care provider, please call [phone] and they will assist you.        Care EveryWhere ID     This is your Care EveryWhere ID. This could be used by other  organizations to access your [facility] medical records  [id number]        Your Vitals Were     Pulse Temperature Respirations BMI (Body Mass Index)          70 98.7  F (37.1  C) (Oral) 16 22.26 kg/m2         Blood Pressure from Last 3 Encounters:   11/29/17 100/60   06/19/17 110/60   05/15/17 120/82    Weight from Last 3 Encounters:   11/29/17 140 lb (63.5 kg)   06/19/17 139 lb (63 kg)   05/15/17 139 lb (63 kg)                 Today's Medication Changes          These changes are accurate as of: 11/29/17  4:53 PM.  If you have any questions, ask your nurse or doctor.               Start taking these medicines.        Dose/Directions    busPIRone 5 MG tablet   Commonly known as:  BUSPAR   Used for:  Anxiety   Started by:  Hiwot Giang APRN CNP        Dose:  10 mg   Take 2 tablets (10 mg) by mouth daily Start taking one tab daily for 5-7 days then increase to two tabs daily.   Quantity:  90 tablet   Refills:  1       propranolol 40 MG tablet   Commonly known as:  INDERAL   Used for:  Panic attack   Started by:  Hiwot Giang APRN CNP        Please take one tab 30-60 minutes prior to anxiety state.   Quantity:  20 tablet   Refills:  0            Where to get your medicines      These medications were sent to [facility] [address] [place] [address] [facility] [address] [facility] [address]  [address] [facility], [facility] [address]     Phone:  [phone]     busPIRone 5 MG tablet    propranolol 40 MG tablet                Primary Care Provider Office Phone # Fax #    Nubia Conde PA-C [phone] [address] [place] [address]        Equal Access to Services     ILIA MIMS AH: Evelyn Ayala, madhu glasgow, daja kaalmada gregory, roseline bender. So [facility] [phone].    ATENCIÓN: Si habla español, tiene a lane disposición servicios gratuitos de asistencia lingüística. Llame al [phone].    We comply with  applicable federal civil rights laws and [place] laws. We do not discriminate on the basis of race, color, national origin, age, disability, sex, sexual orientation, or gender identity.            Thank you!     Thank you for choosing [facility]  for your care. Our goal is always to provide you with excellent care. Hearing back from our patients is one way we can continue to improve our services. Please take a few minutes to complete the written survey that you may receive in the mail after your visit with us. Thank you!             Your Updated Medication List - Protect others around you: Learn how to safely use, store and throw away your medicines at www.disposemymeds.org.          This list is accurate as of: 11/29/17  4:53 PM.  Always use your most recent med list.                   Brand Name Dispense Instructions for use Diagnosis    busPIRone 5 MG tablet    BUSPAR    90 tablet    Take 2 tablets (10 mg) by mouth daily Start taking one tab daily for 5-7 days then increase to two tabs daily.    Anxiety       citalopram 10 MG tablet    celeXA    60 tablet    Take 2 tablets (20 mg) by mouth daily    Major depression in complete remission (H), Anxiety       drospirenone-ethinyl estradiol 3-0.03 MG per tablet    ROBYN    84 tablet    Take 1 tablet by mouth daily    Encounter for surveillance of contraceptives       propranolol 40 MG tablet    INDERAL    20 tablet    Please take one tab 30-60 minutes prior to anxiety state.    Panic attack

## 2017-11-30 ASSESSMENT — ANXIETY QUESTIONNAIRES: GAD7 TOTAL SCORE: 11

## 2017-12-08 ENCOUNTER — MYC MEDICAL ADVICE (OUTPATIENT)
Dept: FAMILY MEDICINE | Facility: OTHER | Age: 27
End: 2017-12-08

## 2017-12-08 NOTE — LETTER
[address] [address]  Phone: [phone]    December 8, 2017        Zuleyka Leslie  [address] [place] [id number]          To whom it may concern:    RE: Zuleyka Leslie    Patient was seen and treated today at our clinic 11/29/2017 she was unable to work 11/28/2017 and again on 11/30/2017 she missed worked related to symptoms from medical condition. Please excuse Zuleyka for these days.     Please contact me for questions or concerns.      Sincerely,        NADINE Arboleda CNP

## 2018-02-06 NOTE — PROGRESS NOTES
SUBJECTIVE:   Zuleyka Leslie is a 27 year old female who presents to clinic today for the following health issues:      HPI  Depression and Anxiety Follow-Up    Status since last visit: Worsened; the last visits medications gave the pt suicidal thoughts, pt stopped meds, restarted celexa (taking it on bad days)      Other associated symptoms: insomnia, sadness, anxiety     Complicating factors:     Significant life event: Yes-  Dropped out of school which reduced stress      Current substance abuse: Alcohol and Cannabis recreationally     PHQ-9 11/22/2017 11/29/2017 2/7/2018   Total Score 13 15 16   Q9: Suicide Ideation Several days Several days More than half the days     OZZIE-7 SCORE 11/22/2017 11/29/2017 2/7/2018   Total Score - - -   Total Score 8 (mild anxiety) - 15 (severe anxiety)   Total Score 8 11 15     In the past two weeks have you had thoughts of suicide or self-harm?  No.  It is that her thoughts of suicide had occurred during a time when she was taking her medication and once she had stopped taking her meds thoughts of suicide stopped as well.  She states that currently she is having no thoughts of suicide or thoughts of harming herself or anyone else.  Do you have concerns about your personal safety or the safety of others?   No  PHQ-9  English  PHQ-9   Any Language  OZZIE-7  Suicide Assessment Five-step Evaluation and Treatment (SAFE-T)  Problem list and histories reviewed & adjusted, as indicated.  Additional history: as documented      Patient Active Problem List   Diagnosis     CARDIOVASCULAR SCREENING; LDL GOAL LESS THAN 160     Dysthymia     Anxiety     Major depression in complete remission (H)     Past Surgical History:   Procedure Laterality Date     APPENDECTOMY  age 7     C ORAL SURGERY PROCEDURE      wisdom tooth extracted     C ORTHODONTIC PROCEDURE         Social History   Substance Use Topics     Smoking status: Never Smoker     Smokeless tobacco: Never Used     Alcohol use 2.5  "oz/week     5 Shots of liquor per week      Comment: \"social\", once every 1 to 2 wks     Family History   Problem Relation Age of Onset     CANCER Maternal Grandfather      lung CA     CANCER Maternal Grandmother      lung CA     DIABETES Maternal Grandmother      Alzheimer Disease Maternal Grandmother      Lactose Intolerance Maternal Grandmother      Thyroid Disease Mother      C.A.D. No family hx of          Current Outpatient Prescriptions   Medication Sig Dispense Refill     drospirenone-ethinyl estradiol (ROBYN) 3-0.03 MG per tablet Take 1 tablet by mouth daily 84 tablet 11     propranolol (INDERAL) 40 MG tablet Please take one tab 30-60 minutes prior to anxiety state. 20 tablet 0     [DISCONTINUED] drospirenone-ethinyl estradiol (ROBYN) 3-0.03 MG per tablet Take 1 tablet by mouth daily 84 tablet 3     Allergies   Allergen Reactions     Nkda [No Known Drug Allergies]      BP Readings from Last 3 Encounters:   02/07/18 122/72   11/29/17 100/60   06/19/17 110/60    Wt Readings from Last 3 Encounters:   02/07/18 135 lb (61.2 kg)   11/29/17 140 lb (63.5 kg)   06/19/17 139 lb (63 kg)        Labs reviewed in EPIC    ROS:  Constitutional, HEENT, cardiovascular, pulmonary, gi and gu systems are negative, except as otherwise noted.    OBJECTIVE:     /72 (BP Location: Left arm, Patient Position: Chair, Cuff Size: Adult Regular)  Pulse 66  Temp 98.2  F (36.8  C) (Oral)  Resp 16  Ht 5' 6.73\" (1.695 m)  Wt 135 lb (61.2 kg)  BMI 21.31 kg/m2  Body mass index is 21.31 kg/(m^2).  GENERAL: healthy, alert and no distress  RESP: lungs clear to auscultation - no rales, rhonchi or wheezes  CV: regular rate and rhythm, normal S1 S2, no S3 or S4, no murmur, click or rub, no peripheral edema and peripheral pulses strong  PSYCH: mentation appears normal, affect normal/bright, judgement and insight intact and appearance well groomed    ASSESSMENT/PLAN:     1. Moderate single current episode of major depressive disorder " (H)  Discussed continuing celexa that this medication is to be taken daily not as needed. Discussed that this medication helps to maintain serotonin level when taken appropriately and for adequate length of time (2-6 weeks) full affect.  Also discussed how to wean off medication appropriately.  Patient states that she feels she is doing better now off her medications and would like to stay off medications at this time.  She states that since she quit school she feels she is much more able to control her symptoms.  Discussed in herbal treatments and reviewed side effects, as patient states that she is about to lose her insurance and would like a backup plan if she needs.    2. Anxiety  Also discussed plan if she starts to have suicidal thoughts again.  She states that she has a boyfriend that she lives with who is very supportive of her also knows that she can call suicide hotline.  Let her know that she can contact me through the clinic at any time if she needed questions answered.    3. Encounter for surveillance of contraceptive pills  Feels given today for OCPs.  - drospirenone-ethinyl estradiol (ROBYN) 3-0.03 MG per tablet; Take 1 tablet by mouth daily  Dispense: 84 tablet; Refill: 11      Home care instructions were reviewed with the patient. The risks, benefits and treatment options of prescribed medications or other treatments have been discussed with the patient. The patient verbalized their understanding and should call or follow up if no improvement or if they develop further problems.   Return to clinic prn    Patient Instructions   Safron (Crocus) 50 mg for alternative anxiety and depression.    Thank you  Hiwot Giang [facility] SHANNON Lou for HPI/ROS submitted by the patient on 2/7/2018   If you checked off any problems, how difficult have these problems made it for you to do your work, take care of things at home, or get along with other people?: Extremely difficult  PHQ9  TOTAL SCORE: 16  OZZIE 7 TOTAL SCORE: 15

## 2018-02-07 ENCOUNTER — OFFICE VISIT (OUTPATIENT)
Dept: FAMILY MEDICINE | Facility: OTHER | Age: 28
End: 2018-02-07
Payer: COMMERCIAL

## 2018-02-07 VITALS
HEIGHT: 67 IN | TEMPERATURE: 98.2 F | BODY MASS INDEX: 21.19 KG/M2 | WEIGHT: 135 LBS | DIASTOLIC BLOOD PRESSURE: 72 MMHG | HEART RATE: 66 BPM | RESPIRATION RATE: 16 BRPM | SYSTOLIC BLOOD PRESSURE: 122 MMHG

## 2018-02-07 DIAGNOSIS — F32.1 MODERATE SINGLE CURRENT EPISODE OF MAJOR DEPRESSIVE DISORDER (H): Primary | ICD-10-CM

## 2018-02-07 DIAGNOSIS — F41.9 ANXIETY: ICD-10-CM

## 2018-02-07 DIAGNOSIS — Z30.41 ENCOUNTER FOR SURVEILLANCE OF CONTRACEPTIVE PILLS: ICD-10-CM

## 2018-02-07 PROCEDURE — 99214 OFFICE O/P EST MOD 30 MIN: CPT | Performed by: NURSE PRACTITIONER

## 2018-02-07 RX ORDER — DROSPIRENONE AND ETHINYL ESTRADIOL 0.03MG-3MG
1 KIT ORAL DAILY
Qty: 84 TABLET | Refills: 11 | Status: SHIPPED | OUTPATIENT
Start: 2018-02-07 | End: 2020-08-05

## 2018-02-07 ASSESSMENT — ANXIETY QUESTIONNAIRES
5. BEING SO RESTLESS THAT IT IS HARD TO SIT STILL: SEVERAL DAYS
7. FEELING AFRAID AS IF SOMETHING AWFUL MIGHT HAPPEN: SEVERAL DAYS
GAD7 TOTAL SCORE: 15
2. NOT BEING ABLE TO STOP OR CONTROL WORRYING: NEARLY EVERY DAY
1. FEELING NERVOUS, ANXIOUS, OR ON EDGE: NEARLY EVERY DAY
3. WORRYING TOO MUCH ABOUT DIFFERENT THINGS: NEARLY EVERY DAY
GAD7 TOTAL SCORE: 15
GAD7 TOTAL SCORE: 15
6. BECOMING EASILY ANNOYED OR IRRITABLE: NEARLY EVERY DAY
7. FEELING AFRAID AS IF SOMETHING AWFUL MIGHT HAPPEN: SEVERAL DAYS
4. TROUBLE RELAXING: SEVERAL DAYS

## 2018-02-07 ASSESSMENT — PATIENT HEALTH QUESTIONNAIRE - PHQ9
SUM OF ALL RESPONSES TO PHQ QUESTIONS 1-9: 16
SUM OF ALL RESPONSES TO PHQ QUESTIONS 1-9: 16
10. IF YOU CHECKED OFF ANY PROBLEMS, HOW DIFFICULT HAVE THESE PROBLEMS MADE IT FOR YOU TO DO YOUR WORK, TAKE CARE OF THINGS AT HOME, OR GET ALONG WITH OTHER PEOPLE: EXTREMELY DIFFICULT

## 2018-02-07 NOTE — NURSING NOTE
"Chief Complaint   Patient presents with     Vaginal Problem     Panel Management     hpv, flu       Initial /72 (BP Location: Left arm, Patient Position: Chair, Cuff Size: Adult Regular)  Pulse 66  Temp 98.2  F (36.8  C) (Oral)  Resp 16  Ht 5' 6.73\" (1.695 m)  Wt 135 lb (61.2 kg)  BMI 21.31 kg/m2 Estimated body mass index is 21.31 kg/(m^2) as calculated from the following:    Height as of this encounter: 5' 6.73\" (1.695 m).    Weight as of this encounter: 135 lb (61.2 kg).  Medication Reconciliation: complete  "

## 2018-02-07 NOTE — MR AVS SNAPSHOT
After Visit Summary   2/7/2018    Zuleyka Leslie    MRN: [id number]           Patient Information     Date Of Birth          1990        Visit Information        Provider Department      2/7/2018 8:20 AM Hiwot Giang APRN CNP [facility]        Today's Diagnoses     Encounter for surveillance of contraceptive pills          Care Instructions    Safron (Crocus) 50 mg for alternative anxiety and depression.    Thank you  Hiwot Giang CNP            Follow-ups after your visit        Who to contact     If you have questions or need follow up information about today's clinic visit or your schedule please contact [facility] directly at [phone].  Normal or non-critical lab and imaging results will be communicated to you by [employer], letter or phone within 4 business days after the clinic has received the results. If you do not hear from us within 7 days, please contact the clinic through [employer] or phone. If you have a critical or abnormal lab result, we will notify you by phone as soon as possible.  Submit refill requests through [employer] or call your pharmacy and they will forward the refill request to us. Please allow 3 business days for your refill to be completed.          Additional Information About Your Visit        MyChart Information     [employer] gives you secure access to your electronic health record. If you see a primary care provider, you can also send messages to your care team and make appointments. If you have questions, please call your primary care clinic.  If you do not have a primary care provider, please call [phone] and they will assist you.        Care EveryWhere ID     This is your Care EveryWhere ID. This could be used by other organizations to access your [facility] medical records  [id number]        Your Vitals Were     Pulse Temperature Respirations Height BMI (Body Mass Index)       66 98.2  F (36.8  C) (Oral) 16 5'  "6.73\" (1.695 m) 21.31 kg/m2        Blood Pressure from Last 3 Encounters:   02/07/18 122/72   11/29/17 100/60   06/19/17 110/60    Weight from Last 3 Encounters:   02/07/18 135 lb (61.2 kg)   11/29/17 140 lb (63.5 kg)   06/19/17 139 lb (63 kg)              Today, you had the following     No orders found for display         Where to get your medicines      These medications were sent to [facility] - [place], [place] - [address] [facility] [address]  [address], [facility] [address]     Phone:  [phone]     drospirenone-ethinyl estradiol 3-0.03 MG per tablet          Primary Care Provider Office Phone # Fax #    Nubia Conde PA-C [phone] [address] [place] [address]        Equal Access to Services     [facility]: Hadii jose enrique ku hadasho Soomaali, waaxda luqadaha, qaybta kaalmada adeegyada, waxay jil rojas . So [facility] [phone].    ATENCIÓN: Si habla español, tiene a lane disposición servicios gratuitos de asistencia lingüística. [facility] [phone].    We comply with applicable federal civil rights laws and [place] laws. We do not discriminate on the basis of race, color, national origin, age, disability, sex, sexual orientation, or gender identity.            Thank you!     Thank you for choosing [facility]  for your care. Our goal is always to provide you with excellent care. Hearing back from our patients is one way we can continue to improve our services. Please take a few minutes to complete the written survey that you may receive in the mail after your visit with us. Thank you!             Your Updated Medication List - Protect others around you: Learn how to safely use, store and throw away your medicines at www.disposemymeds.org.          This list is accurate as of 2/7/18  9:07 AM.  Always use your most recent med list.                   Brand Name Dispense Instructions for use Diagnosis    " drospirenone-ethinyl estradiol 3-0.03 MG per tablet    ROBYN    84 tablet    Take 1 tablet by mouth daily    Encounter for surveillance of contraceptive pills       propranolol 40 MG tablet    INDERAL    20 tablet    Please take one tab 30-60 minutes prior to anxiety state.    Panic attack

## 2018-02-08 ASSESSMENT — PATIENT HEALTH QUESTIONNAIRE - PHQ9: SUM OF ALL RESPONSES TO PHQ QUESTIONS 1-9: 16

## 2018-02-08 ASSESSMENT — ANXIETY QUESTIONNAIRES: GAD7 TOTAL SCORE: 15

## 2018-07-04 ENCOUNTER — MYC MEDICAL ADVICE (OUTPATIENT)
Dept: FAMILY MEDICINE | Facility: OTHER | Age: 28
End: 2018-07-04

## 2018-07-05 NOTE — TELEPHONE ENCOUNTER
Replied via [employer], let pt know WS is out until Monday.     Pt doesn't have insurance but would like a referral to GI if your comfortable with this.

## 2018-07-08 NOTE — TELEPHONE ENCOUNTER
She should not need a referral if she is not going through insurance.   I would need to see her for evaluation if she is needing any further treatment recommendations as I have not seen her for this in past.     Thank you  Hiwot Giang CNP

## 2018-07-10 NOTE — TELEPHONE ENCOUNTER
[facility] message read with no response.  Will close encounter at this time.    Tico Thomas, RN, BSN

## 2018-07-11 ENCOUNTER — TELEPHONE (OUTPATIENT)
Dept: FAMILY MEDICINE | Facility: OTHER | Age: 28
End: 2018-07-11

## 2018-07-11 NOTE — TELEPHONE ENCOUNTER
Summary:    Patient is due/failing the following:   PHQ9    Action needed:   Patient needs to do PHQ9.    Type of outreach:    Sent [employer] message.    Questions for provider review:    None                                                                                                                                    Lidia Nicholas       Chart routed to Care Team .      Panel Management Review      Patient has the following on her problem list:     Depression / Dysthymia review    Measure:  Needs PHQ-9 score of 4 or less during index window.  Administer PHQ-9 and if score is 5 or more, send encounter to provider for next steps.        PHQ-9 SCORE 11/22/2017 11/29/2017 2/7/2018   Total Score - - -   Total Score MyChart 13 (Moderate depression) - 16 (Moderately severe depression)   Total Score 13 15 16       If PHQ-9 recheck is 5 or more, route to provider for next steps.    Patient is due for:  PHQ9      Composite cancer screening  Chart review shows that this patient is due/due soon for the following None

## 2018-09-11 ENCOUNTER — NURSE TRIAGE (OUTPATIENT)
Dept: NURSING | Facility: CLINIC | Age: 28
End: 2018-09-11

## 2018-09-11 ENCOUNTER — OFFICE VISIT (OUTPATIENT)
Dept: FAMILY MEDICINE | Facility: CLINIC | Age: 28
End: 2018-09-11

## 2018-09-11 VITALS
HEART RATE: 76 BPM | HEIGHT: 67 IN | DIASTOLIC BLOOD PRESSURE: 62 MMHG | SYSTOLIC BLOOD PRESSURE: 120 MMHG | OXYGEN SATURATION: 100 % | RESPIRATION RATE: 14 BRPM | BODY MASS INDEX: 22.08 KG/M2 | TEMPERATURE: 98.9 F | WEIGHT: 140.7 LBS

## 2018-09-11 DIAGNOSIS — F32.5 MAJOR DEPRESSION IN COMPLETE REMISSION (H): ICD-10-CM

## 2018-09-11 DIAGNOSIS — K58.9 IRRITABLE BOWEL SYNDROME, UNSPECIFIED TYPE: Primary | ICD-10-CM

## 2018-09-11 PROCEDURE — 99214 OFFICE O/P EST MOD 30 MIN: CPT | Performed by: NURSE PRACTITIONER

## 2018-09-11 RX ORDER — AMITRIPTYLINE HYDROCHLORIDE 10 MG/1
10 TABLET ORAL AT BEDTIME
Qty: 30 TABLET | Refills: 1 | Status: SHIPPED | OUTPATIENT
Start: 2018-09-11 | End: 2020-08-05

## 2018-09-11 RX ORDER — POLYETHYLENE GLYCOL 3350 17 G/17G
1 POWDER, FOR SOLUTION ORAL DAILY
Qty: 510 G | Refills: 11 | Status: SHIPPED | OUTPATIENT
Start: 2018-09-11 | End: 2018-09-11

## 2018-09-11 RX ORDER — AMITRIPTYLINE HYDROCHLORIDE 10 MG/1
10 TABLET ORAL AT BEDTIME
Qty: 30 TABLET | Refills: 1 | Status: SHIPPED | OUTPATIENT
Start: 2018-09-11 | End: 2018-09-11

## 2018-09-11 RX ORDER — POLYETHYLENE GLYCOL 3350 17 G/17G
1 POWDER, FOR SOLUTION ORAL DAILY
Qty: 510 G | Refills: 11 | Status: SHIPPED | OUTPATIENT
Start: 2018-09-11 | End: 2020-08-05

## 2018-09-11 ASSESSMENT — ANXIETY QUESTIONNAIRES
7. FEELING AFRAID AS IF SOMETHING AWFUL MIGHT HAPPEN: NOT AT ALL
3. WORRYING TOO MUCH ABOUT DIFFERENT THINGS: NOT AT ALL
1. FEELING NERVOUS, ANXIOUS, OR ON EDGE: NOT AT ALL
GAD7 TOTAL SCORE: 1
5. BEING SO RESTLESS THAT IT IS HARD TO SIT STILL: NOT AT ALL
GAD7 TOTAL SCORE: 1
7. FEELING AFRAID AS IF SOMETHING AWFUL MIGHT HAPPEN: NOT AT ALL
2. NOT BEING ABLE TO STOP OR CONTROL WORRYING: NOT AT ALL
4. TROUBLE RELAXING: NOT AT ALL
GAD7 TOTAL SCORE: 1
6. BECOMING EASILY ANNOYED OR IRRITABLE: SEVERAL DAYS

## 2018-09-11 ASSESSMENT — PATIENT HEALTH QUESTIONNAIRE - PHQ9
10. IF YOU CHECKED OFF ANY PROBLEMS, HOW DIFFICULT HAVE THESE PROBLEMS MADE IT FOR YOU TO DO YOUR WORK, TAKE CARE OF THINGS AT HOME, OR GET ALONG WITH OTHER PEOPLE: SOMEWHAT DIFFICULT
SUM OF ALL RESPONSES TO PHQ QUESTIONS 1-9: 8
SUM OF ALL RESPONSES TO PHQ QUESTIONS 1-9: 8

## 2018-09-11 NOTE — MR AVS SNAPSHOT
After Visit Summary   9/11/2018    Zuleyka Leslie    MRN: [id number]           Patient Information     Date Of Birth          1990        Visit Information        Provider Department      9/11/2018 12:00 PM Janell Andrade APRN CNP [facility]        Today's Diagnoses     Irritable bowel syndrome, unspecified type    -  1       Follow-ups after your visit        Additional Services     GASTROENTEROLOGY ADULT REF CONSULT ONLY       Preferred Location: [facility], [facility] [phone] and [place] [facility] [phone]      Please be aware that coverage of these services is subject to the terms and limitations of your health insurance plan.  Call member services at your health plan with any benefit or coverage questions.  Any procedures must be performed at a [facility] facility OR coordinated by your clinic's referral office.    Please bring the following with you to your appointment:    (1) Any X-Rays, CTs or MRIs which have been performed.  Contact the facility where they were done to arrange for  prior to your scheduled appointment.    (2) List of current medications   (3) This referral request   (4) Any documents/labs given to you for this referral                  Who to contact     If you have questions or need follow up information about today's clinic visit or your schedule please contact [facility] directly at [phone].  Normal or non-critical lab and imaging results will be communicated to you by MyChart, letter or phone within 4 business days after the clinic has received the results. If you do not hear from us within 7 days, please contact the clinic through MyChart or phone. If you have a critical or abnormal lab result, we will notify you by phone as soon as possible.  Submit refill requests through [employer] or call your pharmacy and they will forward the refill request to us. Please allow 3 business days for your refill to be completed.          Additional  "Information About Your Visit        MyChart Information     [employer] gives you secure access to your electronic health record. If you see a primary care provider, you can also send messages to your care team and make appointments. If you have questions, please call your primary care clinic.  If you do not have a primary care provider, please call [phone] and they will assist you.        Care EveryWhere ID     This is your Care EveryWhere ID. This could be used by other organizations to access your [place] medical records  [id number]        Your Vitals Were     Pulse Temperature Respirations Height Last Period Pulse Oximetry    76 98.9  F (37.2  C) (Temporal) 14 5' 6.7\" (1.694 m) 09/01/2018 (Exact Date) 100%    BMI (Body Mass Index)                   22.24 kg/m2            Blood Pressure from Last 3 Encounters:   09/11/18 120/62   02/07/18 122/72   11/29/17 100/60    Weight from Last 3 Encounters:   09/11/18 140 lb 11.2 oz (63.8 kg)   02/07/18 135 lb (61.2 kg)   11/29/17 140 lb (63.5 kg)              We Performed the Following     DEPRESSION ACTION PLAN (DAP)     GASTROENTEROLOGY ADULT REF CONSULT ONLY          Today's Medication Changes          These changes are accurate as of 9/11/18 12:49 PM.  If you have any questions, ask your nurse or doctor.               Start taking these medicines.        Dose/Directions    amitriptyline 10 MG tablet   Commonly known as:  ELAVIL   Used for:  Irritable bowel syndrome, unspecified type   Started by:  Janell Andrade APRN CNP        Dose:  10 mg   Take 1 tablet (10 mg) by mouth At Bedtime   Quantity:  30 tablet   Refills:  1       polyethylene glycol powder   Commonly known as:  MIRALAX   Used for:  Irritable bowel syndrome, unspecified type   Started by:  Janell Andrade APRN CNP        Dose:  1 capful   Take 17 g (1 capful) by mouth daily   Quantity:  510 g   Refills:  11            Where to get your medicines      These medications were sent to [facility] - " [place], [place] - [address] [address]  [address] [address] [address]     Phone:  [phone]     amitriptyline 10 MG tablet    polyethylene glycol powder                Primary Care Provider Office Phone # Fax #    Nubia Conde PA-C [phone] [address] [place] [address]        Equal Access to Services     [facility]: Hadii aad ku hadasho Soomaali, waaxda luqadaha, qaybta kaalmada adeegyada, waxay idiin hayaan adeeg kharash la'aan ah. So [facility] [phone].    ATENCIÓN: Si habla español, tiene a lane disposición servicios gratuitos de asistencia lingüística. Ll[facility] [phone].    We comply with applicable federal civil rights laws and [place] laws. We do not discriminate on the basis of race, color, national origin, age, disability, sex, sexual orientation, or gender identity.            Thank you!     Thank you for choosing [facility]  for your care. Our goal is always to provide you with excellent care. Hearing back from our patients is one way we can continue to improve our services. Please take a few minutes to complete the written survey that you may receive in the mail after your visit with us. Thank you!             Your Updated Medication List - Protect others around you: Learn how to safely use, store and throw away your medicines at www.disposemymeds.org.          This list is accurate as of 9/11/18 12:49 PM.  Always use your most recent med list.                   Brand Name Dispense Instructions for use Diagnosis    amitriptyline 10 MG tablet    ELAVIL    30 tablet    Take 1 tablet (10 mg) by mouth At Bedtime    Irritable bowel syndrome, unspecified type       drospirenone-ethinyl estradiol 3-0.03 MG per tablet    ROBYN    84 tablet    Take 1 tablet by mouth daily    Encounter for surveillance of contraceptive pills       polyethylene glycol powder    MIRALAX    510 g    Take 17 g (1 capful) by mouth daily    Irritable bowel syndrome, unspecified  type       propranolol 40 MG tablet    INDERAL    20 tablet    Please take one tab 30-60 minutes prior to anxiety state.    Panic attack

## 2018-09-11 NOTE — TELEPHONE ENCOUNTER
"  [facility]NA Triage Call  Presenting Problem:  From [phone] Pt called .  For the last year intermittently having Constipation - alternating with  diarrhea . No insurance = [employer].   This Episode  started today with diarrhea x 1 this morning and nothing since ,  abdominal pain left lower - constant , 5-6/10 on pain scale  since 8am today.   Guideline Used:Abdominal pain - Adult - Female Patient Recommendations/Teaching: see provider in 4 hours , remain NPO and rest til appt and sent to . Also gave from one note [facility]  Free and neighborhood network sliding scale clinic phone numbers for future issues .   .Jacque Neil RN [place] nurse advisors.         Reason for Disposition    [1] MILD-MODERATE pain AND [2] constant AND [3] present > 2 hours    Additional Information    Negative: Shock suspected (e.g., cold/pale/clammy skin, too weak to stand, low BP, rapid pulse)    Negative: Difficult to awaken or acting confused  (e.g., disoriented, slurred speech)    Negative: Passed out (i.e., lost consciousness, collapsed and was not responding)    Negative: Sounds like a life-threatening emergency to the triager    Negative: Chest pain    Negative: Pain is mainly in upper abdomen  (if needed ask: \"is it mainly above the belly button?\")    Negative: Followed an abdomen (stomach) injury    Negative: [1] Abdominal pain AND [2] pregnant < 20 weeks    Negative: [1] Abdominal pain AND [2] pregnant > 20 weeks    Negative: [1] Abdominal pain AND [2] postpartum < 1 month since delivery    Negative: [1] SEVERE pain (e.g., excruciating) AND [2] present > 1 hour    Negative: [1] SEVERE pain AND [2] age > 60    Negative: [1] Vomiting AND [2] contains red blood or black (\"coffee ground\") material  (Exception: few red streaks in vomit that only happened once)    Negative: Blood in bowel movements   (Exception: blood on surface of BM with constipation)    Negative: Black or tarry bowel movements  (Exception: chronic-unchanged  " black-grey bowel movements AND is taking iron pills or Pepto-bismol)    Negative: Patient sounds very sick or weak to the triager    Protocols used: ABDOMINAL PAIN - FEMALE-ADULT-

## 2018-09-11 NOTE — PROGRESS NOTES
SUBJECTIVE:   Zuleyka Leslie is a 28 year old female who presents to clinic today for the following health issues:    History of Present Illness     Diet:  Regular (no restrictions)  Frequency of exercise:  None  Taking medications regularly:  Yes  Medication side effects:  None  Additional concerns today:  No    ABDOMINAL and FLANK PAIN     Onset:  Intermittently for 1 year     Description:   Character: Cramping  Location: lower abdominal   Radiation: lower Back sometimes on and off     Intensity: Depending on the day some are worse than others     Progression of Symptoms:  intermittent    Accompanying Signs & Symptoms:  Fever/Chills?: no  Fever , CHILLS   Gas/Bloating: YES, a lot   Nausea: YES , sometimes off and on   Vomitting: no   Diarrhea?: YES  Constipation:YES  Dysuria or Hematuria: no    History:   Trauma: no   Previous similar pain: no    Previous tests done: none    Precipitating factors:   Does the pain change with:     Food: YES     BM: YES    Urination: none    Therapies Tried and outcome:   Patient tried an intestinal cleanser , lactose pills , pepto bismo , no relief.       LMP:  September 1st 2018      Patient reports of ongoing intermittent constipation for the past year. She denies dietary changes and states she has been trying to increase her water intake. She states she eats dairy once in a while and was taking lactose pills in the past which only helped temporarily. She does not have a normal bowel patterns. She notes of mucous in her stools and has been gassy the pass two days. She rates type 4 on bristol stool chart usually, but today had diarrhea-like stools today. She tried using Intesticlear drops to help pass a bowel movement. She is having lower abdominal pain. She notes her stools are regularly loose and mucousy. She had fevers this morning. She denies chills or vomiting. She is tolerating an appetite well.    She denies family history of crohn's disease or colitis.  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 She lives with her boyfriend currently. She relates her depression and anxiety to being seasonal. She's been treated in the past for her mood (on Celexa), but not currently being treated and feels she doesn't need to be treated at this time. She notes of worsening mood when she has GI flare ups. Last year when she had a flare, a provider prescribed her medication for her mood which helped. She is not in counseling as she does not have insurance currently.    She reports feeling fatigue and needing to miss work all the time due to her GI issues. She receives around 7 hours of sleep per night as she takes melatonin gummies every night.       Problem list and histories reviewed & adjusted, as indicated.  Additional history: as documented  Patient Active Problem List   Diagnosis     CARDIOVASCULAR SCREENING; LDL GOAL LESS THAN 160     Dysthymia     Anxiety     Major depression in complete remission (H)     Past Surgical History:   Procedure Laterality Date     APPENDECTOMY  age 7     C ORAL SURGERY PROCEDURE      wisdom tooth extracted     C ORTHODONTIC PROCEDURE         Social History   Substance Use Topics     Smoking status: Never Smoker     Smokeless tobacco: Never Used     Alcohol use 2.5 oz/week     5 Shots of liquor per week      Comment: \"social\", once every 1 to 2 wks     Family History   Problem Relation Age of Onset     Cancer Maternal Grandfather      lung CA     Cancer Maternal Grandmother      lung CA     Diabetes Maternal Grandmother      Alzheimer Disease Maternal Grandmother      Lactose Intolerance Maternal Grandmother      Thyroid Disease Mother      C.A.D. No family hx of          Current Outpatient Prescriptions   Medication Sig Dispense Refill     amitriptyline (ELAVIL) 10 MG tablet Take 1 tablet (10 mg) by mouth At Bedtime 30 tablet 1     drospirenone-ethinyl estradiol (ROBYN) 3-0.03 MG " "per tablet Take 1 tablet by mouth daily 84 tablet 11     polyethylene glycol (MIRALAX) powder Take 17 g (1 capful) by mouth daily 510 g 11     propranolol (INDERAL) 40 MG tablet Please take one tab 30-60 minutes prior to anxiety state. (Patient not taking: Reported on 9/11/2018) 20 tablet 0     Allergies   Allergen Reactions     Nkda [No Known Drug Allergies]      Recent Labs   Lab Test  02/05/17   1524  01/27/17   1257  07/24/14   1450   ALT  20  30   --    CR  0.69  0.66  0.72   GFRESTIMATED  >90  Non  GFR Calc    >90  Non  GFR Calc    >90   GFRESTBLACK  >90   GFR Calc    >90   GFR Calc    >90   POTASSIUM  3.4  3.6  3.6   TSH   --   1.85  1.67      BP Readings from Last 3 Encounters:   09/11/18 120/62   02/07/18 122/72   11/29/17 100/60    Wt Readings from Last 3 Encounters:   09/11/18 140 lb 11.2 oz (63.8 kg)   02/07/18 135 lb (61.2 kg)   11/29/17 140 lb (63.5 kg)            ROS:  Constitutional, HEENT, cardiovascular, pulmonary, GI, , musculoskeletal, neuro, skin, endocrine and psych systems are negative, except as otherwise noted.    This document serves as a record of the services and decisions personally performed and made by Janell Andrade CNP. It was created on her behalf by Laura Armstrong, a trained medical scribe. The creation of this document is based the provider's statements to the medical scribe.    Laura Armstrong September 11, 2018 12:23 PM    OBJECTIVE:   /62  Pulse 76  Temp 98.9  F (37.2  C) (Temporal)  Resp 14  Ht 5' 6.7\" (1.694 m)  Wt 140 lb 11.2 oz (63.8 kg)  LMP 09/01/2018 (Exact Date)  SpO2 100%  BMI 22.24 kg/m2  Body mass index is 22.24 kg/(m^2).  GENERAL: healthy, alert and no distress  HENT: oropharynx clear and oral mucous membranes moist  NECK: no adenopathy, no asymmetry, masses, or scars and thyroid normal to palpation  RESP: lungs clear to auscultation - no rales, rhonchi or wheezes  CV: regular rate and rhythm, " normal S1 S2, no S3 or S4, no murmur, click or rub, no peripheral edema and peripheral pulses strong  ABDOMEN: soft. lDiffused tenderness in right lower and left quadrant on initial exam. On repeat exam, Pt was relaxed and nontender without guarding, no rebound, no HSM. Normal bowel sounds.  NEURO: Normal strength and tone, mentation intact and speech normal  PSYCH: Tearful, slouched posture, adequate eye contact but low, insight is unclear, judgment intact, tearful at times.     Diagnostic Test Results:  No results found for this or any previous visit (from the past 24 hour(s)).    ASSESSMENT/PLAN:       ICD-10-CM    1. Irritable bowel syndrome, unspecified type K58.9 GASTROENTEROLOGY ADULT REF CONSULT ONLY     amitriptyline (ELAVIL) 10 MG tablet     polyethylene glycol (MIRALAX) powder     DISCONTINUED: amitriptyline (ELAVIL) 10 MG tablet     DISCONTINUED: polyethylene glycol (MIRALAX) powder   2. Major depression in complete remission (H) F32.5    Recommended bland diet, miralax half cap full with fluids, increase water, decrease fatty foods, keep stools soft for healing. Can consider stool studies if not improving. Specialty referral placed just in case pt. Would like to schedule.  Recommended vit. D supplementing.  Discussed that healthy habits and exercise may improve GI symptoms.   Warning signs discussed.  Discussed mood at length and discussed that Pt's mood could be contributing to her GI concerns. She will start on Amitriptyline 10mg and follow-up again in 1 month for medication recheck. Common side effects discussed.  Pt. Declined flu shot today.    Follow-up - Return to clinic with any new or worsening symptoms, and as needed.    The information in this document, created by the medical scribe for me, accurately reflects the services I personally performed and the decisions made by me. I have reviewed and approved this document for accuracy prior to leaving the patient care area.    NADINE Becker  [facility]

## 2018-09-12 ASSESSMENT — ANXIETY QUESTIONNAIRES: GAD7 TOTAL SCORE: 1

## 2018-09-12 ASSESSMENT — PATIENT HEALTH QUESTIONNAIRE - PHQ9: SUM OF ALL RESPONSES TO PHQ QUESTIONS 1-9: 8

## 2018-10-26 ENCOUNTER — TELEPHONE (OUTPATIENT)
Dept: FAMILY MEDICINE | Facility: CLINIC | Age: 28
End: 2018-10-26

## 2018-10-26 NOTE — LETTER
[facility]  [address]  [place] [address]  [phone]      October 29, 2018    Zuleyka Leslie                                                                                                                     [address] [place] [id number]        Dear Zuleyka,    We received a notice that you are to be scheduled with a specialty clinic. The referral has been placed by your provider and you can call to schedule an appointment directly.     Enclosed, you will find the referral with the phone number to call to schedule an appointment.  If you have already scheduled this, you may disregard this letter.    Please call us if you have any questions or concerns.      Sincerely,     [facility] Support Staff / swetha

## 2018-10-26 NOTE — TELEPHONE ENCOUNTER
Left message asking pt to return clinic, has pt been able to F/U with GI, if so where?    Hailee Pappas, RN, BSN

## 2018-10-26 NOTE — TELEPHONE ENCOUNTER
You placed a referral to GI/Multiple locations on 9/11/18.    It is unclear if the patient has scheduled yet, not finding a report showing they were seen.     Please forward to your team if further follow up is needed to see if they have made this appointment.      Thank you!   Sabrina/Referral Representative for [facility]

## 2020-01-24 ENCOUNTER — E-VISIT (OUTPATIENT)
Dept: FAMILY MEDICINE | Facility: CLINIC | Age: 30
End: 2020-01-24
Payer: COMMERCIAL

## 2020-01-24 ENCOUNTER — MYC REFILL (OUTPATIENT)
Dept: FAMILY MEDICINE | Facility: CLINIC | Age: 30
End: 2020-01-24

## 2020-01-24 DIAGNOSIS — K58.9 IRRITABLE BOWEL SYNDROME, UNSPECIFIED TYPE: ICD-10-CM

## 2020-01-24 DIAGNOSIS — F33.1 MAJOR DEPRESSIVE DISORDER, RECURRENT EPISODE, MODERATE (H): ICD-10-CM

## 2020-01-24 PROCEDURE — 99421 OL DIG E/M SVC 5-10 MIN: CPT | Performed by: NURSE PRACTITIONER

## 2020-01-24 RX ORDER — AMITRIPTYLINE HYDROCHLORIDE 10 MG/1
10 TABLET ORAL AT BEDTIME
Qty: 30 TABLET | Refills: 0 | Status: SHIPPED | OUTPATIENT
Start: 2020-01-24 | End: 2020-08-05

## 2020-01-24 RX ORDER — AMITRIPTYLINE HYDROCHLORIDE 10 MG/1
10 TABLET ORAL AT BEDTIME
Qty: 30 TABLET | Refills: 1 | OUTPATIENT
Start: 2020-01-24

## 2020-01-24 ASSESSMENT — ANXIETY QUESTIONNAIRES
7. FEELING AFRAID AS IF SOMETHING AWFUL MIGHT HAPPEN: SEVERAL DAYS
4. TROUBLE RELAXING: SEVERAL DAYS
7. FEELING AFRAID AS IF SOMETHING AWFUL MIGHT HAPPEN: SEVERAL DAYS
5. BEING SO RESTLESS THAT IT IS HARD TO SIT STILL: SEVERAL DAYS
1. FEELING NERVOUS, ANXIOUS, OR ON EDGE: SEVERAL DAYS
GAD7 TOTAL SCORE: 7
2. NOT BEING ABLE TO STOP OR CONTROL WORRYING: SEVERAL DAYS
3. WORRYING TOO MUCH ABOUT DIFFERENT THINGS: SEVERAL DAYS
GAD7 TOTAL SCORE: 7
6. BECOMING EASILY ANNOYED OR IRRITABLE: SEVERAL DAYS

## 2020-01-24 NOTE — TELEPHONE ENCOUNTER
Provider E-Visit time total (minutes): chrat review, attempted to call pt.   Provider time over 5 minutes.   NADINE Becker CNP

## 2020-01-24 NOTE — PATIENT INSTRUCTIONS
"    Warning Signs of Suicide and What You Can Do    If you think a person could be suicidal, ask, \"Have you thought about suicide?\" If they say \"yes,\" they may already have a plan for how and when they will attempt it. Find out as much as you can. The more detailed the plan, and the easier it is to carry out, the more danger the person is in right now.  Know the warning signs  The warning signs for suicide include:    Threats or talk of suicide    Sense of hopelessness    Buying a gun or other weapon    Statements such as \"Soon, I won't be a problem\" or \"Nothing matters\"    Giving away items they own, making out a will, or planning their     Suddenly being happy or calm after being depressed  Factors that put a person at a higher risk of attempting suicide include:    A history of suicide in the person's family    Previous suicide attempts    Alcohol and drug use, along with impulsive behaviors    Having a diagnose mood disorder such as depression or bipolar disorder    History of trauma or abuse including bullying    Significant losses such as a divorce, death of a loved one, financial problems, or legal problems    Having access to a lethal weapon (for example firearms in the home)    Chronic physical illnesses, including chronic pain    Exposure to suicidal behavior of others  Get help  Don't try to handle this alone. You can be the most help by getting the person to a trained professional. Suicidal thinking may be a sign of depression, a serious but treatable illness.  In an emergency--call 911  Don't leave the person alone. Anyone who is at imminent risk of suicide needs psychiatric services right away. The person must be continuously monitored, and never left out of sight. Call 911 or a 24-hour suicide crisis hotline. It can be found in the white pages of your phone book under \"Suicide.\" You can also take the person to the nearest hospital emergency room (ER).  Don't keep it a secret and don't " wait  Call a mental health clinic or a licensed mental health professional in your area right away: a psychiatrist, clinical psychologist, psychiatric or licensed clinical , marriage and family counselor, or clergy. Tell them you need help for a person who is thinking about suicide.  Resources    National Suicide Prevention Tafyomrj857-460-7656 (325-176-RIKO)www.suicidepreventionlifeline.org    National Suicide Oworkzv007-307-2470 (800-SUICIDE)    National [place] of Mental Edufmh289-675-1115rxa.[facility].nih.gov    National [place] on Mental Whrkxee354-329-5126wpi.fior.org    Mental Health Rfgsqbh021-663-9991clh.nmha.org   Date Last Reviewed: 1/1/2017 2000-2019 [employer]. [address] [place] [address]. All rights reserved. This information is not intended as a substitute for professional medical care. Always follow your healthcare professional's instructions.          Depression: Tips to Help Yourself    As your healthcare providers help treat your depression, you can also help yourself. Keep in mind that your illness affects you emotionally, physically, mentally, and socially. So full recovery will take time. Take care of your body and your soul, and be patient with yourself as you get better.  Self-care    Educate yourself. Read about treatment and medicine options. If you have the energy, attend local conferences or support groups. Keep a list of useful websites and helpful books and use them as needed. This illness is not your fault. Don t blame yourself for your depression.    Manage early symptoms. If you notice symptoms returning, experience triggers, or identify other factors that may lead to a depressive episode, get help as soon as possible. Ask trusted friends and family to monitor your behavior and let you know if they see anything of concern.    Work with your provider. Find a provider you can trust. Communicate honestly with that person and share information  on your treatment for depression and your reaction to medicines.    Be prepared for a crisis. Know what to do if you experience a crisis. Keep the phone number of a crisis hotline and know the location of your community's urgent care centers and the closest emergency department.    Hold off on big decisions. Depression can cloud your judgment. So wait until you feel better before making major life decisions, such as changing jobs, moving, or getting  or .    Be patient. Recovering from depression is a process. Don t be discouraged if it takes some time to feel better.    Keep it simple. Depression saps your energy and concentration. So you won t be able to do all the things you used to do. Set small goals and do what you can.    Be with others. Don t isolate yourself--you ll only feel worse. Try to be with other people. And take part in fun activities when you can. Go to a movie, ballgame, Ten[employer] service, or social event. Talk openly with people you can trust. And accept help when it s offered.  Take care of your body  People with depression often lose the desire to take care of themselves. That only makes their problems worse. During treatment and afterward, make a point to:    Exercise. It s a great way to take care of your body. And studies have shown that exercise helps fight depression.    Avoid drugs and alcohol. These may ease the pain in the short term. But they ll only make your problems worse in the long run.    Get relief from stress. Ask your healthcare provider for relaxation exercises and techniques to help relieve stress.    Eat right. A balanced and healthy diet helps keep your body healthy.  Date Last Reviewed: 1/1/2017 2000-2019 [employer]. [address] [place] [address], [place] [address]. All rights reserved. This information is not intended as a substitute for professional medical care. Always follow your healthcare professional's instructions.          Depression  Affects Your Mind and Body  Everyone feels sad or  blue  from time to time for a few days or weeks. Depression is when these feelings don't go away and they interfere with daily life.  Depression is a real illness that can develop at any age. It is one of the most common mental health problems in the [place] Depression makes you feel sad, helpless, and hopeless. It gets in the way of your life and relationships. It inhibits your ability to think and act. But, with help, you can feel better again.      When I was depressed, I felt awful. I was so tired all the time I could hardly think, but at night I couldn t fall asleep. My head hurt. My stomach hurt. I didn t know what was wrong with me.    Depression affects your whole body  Brain chemicals affect your body as well as your mood. So depression may do more than just make you feel low. You may also feel bad physically. Depression can:    Cause trouble with mental tasks such as remembering, concentrating, or making decisions    Make you feel nervous and jumpy    Cause trouble sleeping. Or you may sleep too much    Change your appetite    Cause headaches, stomachaches, or other aches and pains    Drain your body of energy  Depression and other illness  It is common for people who have chronic health problems to also have depression. It can often be hard to tell which one caused the other. A person might become depressed after finding out they have a health problem. But some studies suggest being depressed may make certain health problems more likely. And some depressed people stop taking care of themselves. This may make them more likely to get sick.  Date Last Reviewed: 1/1/2017 2000-2019 [employer]. [address] [place] [address], [place] [address]. All rights reserved. This information is not intended as a substitute for professional medical care. Always follow your healthcare professional's instructions.        Rea Gardiner,   I'm sorry you are going  "through so much stress.   I can refill you for 1 month. With the severity of your symptoms, I recommend stopping alcohol and marijuana use. Please re-start this medication and take daily prior to bed.    Also, you may have some confusion about this medication, but it is recommended to take- every day. It is not a medication for \"as needed\" use.   Sincerely,   NADINE Becker CNP   "

## 2020-01-25 ASSESSMENT — ANXIETY QUESTIONNAIRES: GAD7 TOTAL SCORE: 7

## 2020-02-23 ENCOUNTER — HEALTH MAINTENANCE LETTER (OUTPATIENT)
Age: 30
End: 2020-02-23

## 2020-04-30 ENCOUNTER — VIRTUAL VISIT (OUTPATIENT)
Dept: FAMILY MEDICINE | Facility: OTHER | Age: 30
End: 2020-04-30

## 2020-04-30 NOTE — PROGRESS NOTES
"Date: 2020 08:35:45  Clinician: Barb Archer  Clinician NPI: [id number]  Patient: Zuleyka Leslie  Patient : 1990  Patient Address: [address] [place] [address]  Patient Phone: [phone]  Visit Protocol: URI  Patient Summary:  Zuleyka is a 30 year old ( : 1990 ) female who initiated a Visit for COVID-19 (Coronavirus) evaluation and screening. When asked the question \"Please sign me up to receive news, health information and promotions. \", Zuleyka responded \"No\".    Zuleyka states her symptoms started gradually 3-4 days ago. After her symptoms started, they improved and then got worse again.   Her symptoms consist of malaise, myalgia, rhinitis, a sore throat, a cough, diarrhea, and a headache. She is experiencing mild difficulty breathing with activities but can speak normally in full sentences.   Symptom details     Nasal secretions: The color of her mucus is clear.    Cough: Zuleyka coughs almost every minute and her cough is not more bothersome at night. Phlegm comes into her throat when she coughs. She believes her cough is caused by post-nasal drip. The color of the phlegm is clear.     Sore throat: Zuleyka reports having mild throat pain (1-3 on a 10 point pain scale), does not have exudate on her tonsils, and can swallow liquids. The lymph nodes in her neck are not enlarged. A rash has not appeared on the skin since the sore throat started.     Headache: She states the headache is mild (1-3 on a 10 point pain scale).      Zuleyka denies having fever, facial pain or pressure, nasal congestion, vomiting, nausea, teeth pain, ageusia, anosmia, ear pain, wheezing, enlarged lymph nodes, and chills. She also denies taking antibiotic medication for the symptoms, having recent facial or sinus surgery in the past 60 days, and having a sinus infection within the past year.   Precipitating events  Within the past week, Zuleyka has not been exposed to someone with strep throat. She has not " Noted. This is patient's 1st no show with Dr. Cottrell.    Please send an Information Letter.   recently been exposed to someone with influenza. Zuleyka has not been in close contact with any high risk individuals.   Pertinent COVID-19 (Coronavirus) information  Zuleyka does not work or volunteer as healthcare worker or a  and does not work or volunteer in a healthcare facility.   She does not live with a healthcare worker.   Zuleyka has not had a close contact with a laboratory-confirmed COVID-19 patient within 14 days of symptom onset. She has had a close contact with a suspected COVID-19 patient within 14 days of symptom onset. Additional information about contact with COVID-19 (Coronavirus) patient as reported by the patient (free text): I was in contact w someone who was not tested but was on a 2 week condiment due to symptoms.   Pertinent medical history  Zuleyka does not get yeast infections when she takes antibiotics.   Zuleyka does not need a return to work/school note.   Weight: 140 lbs   Zuleyka smokes or uses smokeless tobacco.   She denies pregnancy and denies breastfeeding. She has menstruated in the past month.   Additional information as reported by the patient (free text): I was kind of sick Monday, very sick Tuesday, off and on sick Wednesday, and my cough and fatigue returned this morning.   Weight: 140 lbs    MEDICATIONS: No current medications, ALLERGIES: NKDA  Clinician Response:  Dear Zuleyka,   Your symptoms show that you may have coronavirus (COVID-19). This illness can cause fever, cough and trouble breathing. Many people get a mild case and get better on their own. Some people can get very sick.   Will I be tested for COVID-19?  Because we have limited testing supplies we are not testing everyone if they are low risk. We are testing if:   You are very ill. For example, you're on chemotherapy, dialysis or home hospice care. (Contact your specialty clinic or program.)   You live in a nursing home or other long-term care facility. (Talk to your nurse manager or medical  director.)   You're a health care worker. ([facility] employees Contact our employee health office for testing.)   We are performing limited curbside testing for healthcare/first responders and people with medical problems that put them at increased risk. It does not appear by the [facility] information you submitted that you meet any of these criteria. If there are medical problems that we did not know about, please repeat an [facility] visit and let us know what medical conditions you have.   How can I protect others?  Without a test, we can't know for sure that you have COVID-19. For safety, it's very important to follow these rules.  First, stay home and away from others (self-isolate) until:   You've had no fever---and no medicine that reduces fever---for 3 full days (72 hours). And...    Your other symptoms have gotten better. For example, your cough or breathing has improved. And...   At least 7 days have passed since your symptoms started.   During this time:   Don't go to work, school or anywhere else.    Stay away from others in your home. No hugging, kissing or shaking hands.   Don't let anyone visit.   Cover your mouth and nose with a mask, tissue or wash cloth to avoid spreading germs.   Wash your hands and face often. Use soap and water.   How can I take care of myself?  1.Take Tylenol (acetaminophen) for fever or pain. If you have liver or kidney problems, ask your family doctor if it's okay to take Tylenol.   Adults can take either:    650 mg (two 325 mg pills) every 4 to 6 hours, or...   1,000 mg (two 500 mg pills) every 8 hours as needed.    Note: Don't take more than 3,000 mg in one day.  For children, check the Tylenol bottle for the right dose. The dose is based on the child's age or weight.   2.If you have other health problems (like cancer, heart failure, an organ transplant or severe kidney disease): Call your specialty clinic if you don't feel better in the next 2 days.  3.Know when to call  911: If your breathing is so bad that it keeps you from doing normal activities, call 911 or go to the emergency room. Tell them that you've been staying home and may have COVID-19.  4.Sign up for OOYYO. We know it's scary to hear that you might have COVID-19. We want to track your symptoms to make sure you're okay over the next 2 weeks. Please look for an email from OOYYO---this is a free, online program that we'll use to keep in touch. To sign up, follow the link in the email. Learn more at [email or web address].  Where can I get more information?  To learn more about COVID-19 and how to care for yourself at home, please visit the CDC website at https://www.cdc.gov/coronavirus/2019-ncov/about/steps-when-sick.html.  For more options for care at [facility], please visit our website at [email or web address]   If you are interested in becoming part of a [facility] clinic trial related to COVID19 please go to [email or web address] for information, if you qualify.      Diagnosis: Acute upper respiratory infection, unspecified  Diagnosis ICD: J06.9

## 2020-07-30 NOTE — PROGRESS NOTES
"   SUBJECTIVE:   CC: Zuleyka Leslie is an 30 year old woman who presents for preventive health visit.     Healthy Habits:     Getting at least 3 servings of Calcium per day:  NO    Bi-annual eye exam:  NO    Dental care twice a year:  NO    Sleep apnea or symptoms of sleep apnea:  None    Diet:  Other    Frequency of exercise:  1 day/week    Duration of exercise:  Other    Taking medications regularly:  Not Applicable    Medication side effects:  Not applicable    PHQ-2 Total Score: 0    Additional concerns today:  Yes    She is interested in an IUD.  She has noticed mood swings and increased irritable bowel symptoms with birth control pills.  Both symptoms improved when she went off birth control.  She is most interested in an IUD and wonders if the ParaGard T or the Mirena would be a better option.      Today's PHQ-2 Score:   PHQ-2 ( 1999 Pfizer) 8/4/2020   Q1: Little interest or pleasure in doing things 0   Q2: Feeling down, depressed or hopeless 0   PHQ-2 Score 0   Q1: Little interest or pleasure in doing things Not at all   Q2: Feeling down, depressed or hopeless Not at all   PHQ-2 Score 0       Abuse: Current or Past(Physical, Sexual or Emotional)- No  Do you feel safe in your environment? Yes        Social History     Tobacco Use     Smoking status: Never Smoker     Smokeless tobacco: Never Used   Substance Use Topics     Alcohol use: Yes     Alcohol/week: 4.2 standard drinks     Types: 5 Shots of liquor per week     Comment: \"social\", once every 1 to 2 wks         Alcohol Use 8/4/2020   Prescreen: >3 drinks/day or >7 drinks/week? Yes   Prescreen: >3 drinks/day or >7 drinks/week? -   AUDIT SCORE  12       Reviewed orders with patient.  Reviewed health maintenance and updated orders accordingly - Yes  Labs reviewed in EPIC  BP Readings from Last 3 Encounters:   08/05/20 98/54   09/11/18 120/62   02/07/18 122/72    Wt Readings from Last 3 Encounters:   08/05/20 67 kg (147 lb 12.8 oz)   09/11/18 63.8 kg " "(140 lb 11.2 oz)   02/07/18 61.2 kg (135 lb)                  Patient Active Problem List   Diagnosis     CARDIOVASCULAR SCREENING; LDL GOAL LESS THAN 160     Dysthymia     Anxiety     Major depression in complete remission (H)     Past Surgical History:   Procedure Laterality Date     APPENDECTOMY  age 7     C ORAL SURGERY PROCEDURE      wisdom tooth extracted     C ORTHODONTIC PROCEDURE         Social History     Tobacco Use     Smoking status: Never Smoker     Smokeless tobacco: Never Used   Substance Use Topics     Alcohol use: Yes     Alcohol/week: 4.2 standard drinks     Types: 5 Shots of liquor per week     Comment: \"social\", once every 1 to 2 wks     Family History   Problem Relation Age of Onset     Cancer Maternal Grandfather         lung CA     Cancer Maternal Grandmother         lung CA     Diabetes Maternal Grandmother      Alzheimer Disease Maternal Grandmother      Lactose Intolerance Maternal Grandmother      Thyroid Disease Mother      Colon Cancer Father      C.A.D. No family hx of          No current outpatient medications on file.     Allergies   Allergen Reactions     Nkda [No Known Drug Allergies]        Mammogram not appropriate for this patient based on age.    Pertinent mammograms are reviewed under the imaging tab.  History of abnormal Pap smear:   NO - age 21-29 PAP every 3 years recommended  Last 3 Pap Results:   PAP (no units)   Date Value   06/19/2017 NIL   05/06/2014 NIL   03/23/2010 NIL     PAP / HPV 6/19/2017 5/6/2014 3/23/2010   PAP NIL NIL NIL     Reviewed and updated as needed this visit by clinical staff  Tobacco  Allergies  Meds  Problems  Med Hx  Surg Hx  Fam Hx  Soc Hx          Reviewed and updated as needed this visit by Provider  Tobacco  Allergies  Meds  Problems  Med Hx  Surg Hx  Fam Hx            Review of Systems   Constitutional: Negative for chills and fever.   HENT: Negative for congestion, ear pain, hearing loss and sore throat.    Eyes: Negative for " "pain and visual disturbance.   Respiratory: Negative for cough and shortness of breath.    Cardiovascular: Negative for chest pain, palpitations and peripheral edema.   Gastrointestinal: Negative for abdominal pain, constipation, diarrhea, heartburn, hematochezia and nausea.   Breasts:  Negative for tenderness, breast mass and discharge.   Genitourinary: Negative for dysuria, frequency, genital sores, hematuria, pelvic pain, urgency, vaginal bleeding and vaginal discharge.   Musculoskeletal: Negative for arthralgias, joint swelling and myalgias.   Skin: Negative for rash.   Neurological: Negative for dizziness, weakness, headaches and paresthesias.   Psychiatric/Behavioral: Negative for mood changes. The patient is nervous/anxious.      She is managing her anxiety at this point but just getting through.  She does not have a lot of time to exercise.  She works 2 jobs.  She starts to feel burnt out.  She is having difficulty with sleep.  She has never tried melatonin.     OBJECTIVE:   BP 98/54   Pulse 79   Temp 98.1  F (36.7  C) (Oral)   Resp 18   Ht 1.683 m (5' 6.25\")   Wt 67 kg (147 lb 12.8 oz)   LMP 08/04/2020 (Exact Date)   SpO2 97%   Breastfeeding No   BMI 23.68 kg/m    Physical Exam  GENERAL: healthy, alert and no distress  EYES: Eyes grossly normal to inspection, PERRL and conjunctivae and sclerae normal  HENT: ear canals and TM's normal, nose and mouth without ulcers or lesions  NECK: no adenopathy, no asymmetry, masses, or scars and thyroid normal to palpation  RESP: lungs clear to auscultation - no rales, rhonchi or wheezes  BREAST: normal without masses, tenderness or nipple discharge and no palpable axillary masses or adenopathy  CV: regular rate and rhythm, normal S1 S2, no S3 or S4, no murmur, click or rub, no peripheral edema and peripheral pulses strong  ABDOMEN: soft, nontender, no hepatosplenomegaly, no masses and bowel sounds normal   (female): normal female external genitalia, normal " "urethral meatus, vaginal mucosa pink, moist, well rugated, and normal cervix/adnexa/uterus without masses or discharge  MS: no gross musculoskeletal defects noted, no edema  SKIN: no suspicious lesions or rashes  NEURO: Normal strength and tone, mentation intact and speech normal  PSYCH: mentation appears normal, affect normal/bright    Diagnostic Test Results:  Labs reviewed in Epic  none     ASSESSMENT/PLAN:   1. Routine general medical examination at a health care facility  Healthy female, she will make an appointment to have IUD placed     2. Screening for malignant neoplasm of cervix  pending  - Pap imaged thin layer screen reflex to HPV if ASCUS - recommend age 25 - 29    COUNSELING:  Reviewed preventive health counseling, as reflected in patient instructions    Estimated body mass index is 23.68 kg/m  as calculated from the following:    Height as of this encounter: 1.683 m (5' 6.25\").    Weight as of this encounter: 67 kg (147 lb 12.8 oz).         reports that she has never smoked. She has never used smokeless tobacco.      Counseling Resources:  ATP IV Guidelines  Pooled Cohorts Equation Calculator  Breast Cancer Risk Calculator  FRAX Risk Assessment  ICSI Preventive Guidelines  Dietary Guidelines for Americans, 2010  USDA's MyPlate  ASA Prophylaxis  Lung CA Screening    Nubia Conde PA-C  [facility]  Answers for HPI/ROS submitted by the patient on 8/4/2020   Annual Exam:  If you checked off any problems, how difficult have these problems made it for you to do your work, take care of things at home, or get along with other people?: Somewhat difficult  PHQ9 TOTAL SCORE: 5  OZZIE 7 TOTAL SCORE: 6    "

## 2020-08-04 ASSESSMENT — ANXIETY QUESTIONNAIRES
6. BECOMING EASILY ANNOYED OR IRRITABLE: SEVERAL DAYS
7. FEELING AFRAID AS IF SOMETHING AWFUL MIGHT HAPPEN: NOT AT ALL
GAD7 TOTAL SCORE: 6
GAD7 TOTAL SCORE: 6
2. NOT BEING ABLE TO STOP OR CONTROL WORRYING: SEVERAL DAYS
1. FEELING NERVOUS, ANXIOUS, OR ON EDGE: MORE THAN HALF THE DAYS
7. FEELING AFRAID AS IF SOMETHING AWFUL MIGHT HAPPEN: NOT AT ALL
5. BEING SO RESTLESS THAT IT IS HARD TO SIT STILL: NOT AT ALL
3. WORRYING TOO MUCH ABOUT DIFFERENT THINGS: SEVERAL DAYS
4. TROUBLE RELAXING: SEVERAL DAYS
GAD7 TOTAL SCORE: 6

## 2020-08-04 ASSESSMENT — ENCOUNTER SYMPTOMS
HEADACHES: 0
CHILLS: 0
PARESTHESIAS: 0
DYSURIA: 0
JOINT SWELLING: 0
HEMATURIA: 0
ABDOMINAL PAIN: 0
WEAKNESS: 0
NAUSEA: 0
SHORTNESS OF BREATH: 0
BREAST MASS: 0
FEVER: 0
DIZZINESS: 0
SORE THROAT: 0
FREQUENCY: 0
HEMATOCHEZIA: 0
EYE PAIN: 0
HEARTBURN: 0
CONSTIPATION: 0
DIARRHEA: 0
PALPITATIONS: 0
COUGH: 0
MYALGIAS: 0
NERVOUS/ANXIOUS: 1
ARTHRALGIAS: 0

## 2020-08-04 ASSESSMENT — PATIENT HEALTH QUESTIONNAIRE - PHQ9
SUM OF ALL RESPONSES TO PHQ QUESTIONS 1-9: 5
10. IF YOU CHECKED OFF ANY PROBLEMS, HOW DIFFICULT HAVE THESE PROBLEMS MADE IT FOR YOU TO DO YOUR WORK, TAKE CARE OF THINGS AT HOME, OR GET ALONG WITH OTHER PEOPLE: SOMEWHAT DIFFICULT
SUM OF ALL RESPONSES TO PHQ QUESTIONS 1-9: 5

## 2020-08-05 ENCOUNTER — OFFICE VISIT (OUTPATIENT)
Dept: FAMILY MEDICINE | Facility: OTHER | Age: 30
End: 2020-08-05
Payer: COMMERCIAL

## 2020-08-05 VITALS
SYSTOLIC BLOOD PRESSURE: 98 MMHG | HEART RATE: 79 BPM | WEIGHT: 147.8 LBS | OXYGEN SATURATION: 97 % | DIASTOLIC BLOOD PRESSURE: 54 MMHG | TEMPERATURE: 98.1 F | RESPIRATION RATE: 18 BRPM | BODY MASS INDEX: 23.75 KG/M2 | HEIGHT: 66 IN

## 2020-08-05 DIAGNOSIS — Z12.4 SCREENING FOR MALIGNANT NEOPLASM OF CERVIX: ICD-10-CM

## 2020-08-05 DIAGNOSIS — Z00.00 ROUTINE GENERAL MEDICAL EXAMINATION AT A HEALTH CARE FACILITY: ICD-10-CM

## 2020-08-05 PROCEDURE — G0145 SCR C/V CYTO,THINLAYER,RESCR: HCPCS | Performed by: PHYSICIAN ASSISTANT

## 2020-08-05 PROCEDURE — 99395 PREV VISIT EST AGE 18-39: CPT | Performed by: PHYSICIAN ASSISTANT

## 2020-08-05 ASSESSMENT — ENCOUNTER SYMPTOMS
FREQUENCY: 0
NERVOUS/ANXIOUS: 1
NAUSEA: 0
CHILLS: 0
SHORTNESS OF BREATH: 0
HEMATOCHEZIA: 0
JOINT SWELLING: 0
ARTHRALGIAS: 0
PARESTHESIAS: 0
DIZZINESS: 0
MYALGIAS: 0
WEAKNESS: 0
COUGH: 0
HEADACHES: 0
EYE PAIN: 0
CONSTIPATION: 0
FEVER: 0
ABDOMINAL PAIN: 0
DYSURIA: 0
HEMATURIA: 0
DIARRHEA: 0
BREAST MASS: 0
PALPITATIONS: 0
HEARTBURN: 0
SORE THROAT: 0

## 2020-08-05 ASSESSMENT — ANXIETY QUESTIONNAIRES: GAD7 TOTAL SCORE: 6

## 2020-08-05 ASSESSMENT — PATIENT HEALTH QUESTIONNAIRE - PHQ9: SUM OF ALL RESPONSES TO PHQ QUESTIONS 1-9: 5

## 2020-08-05 ASSESSMENT — MIFFLIN-ST. JEOR: SCORE: 1421.14

## 2020-08-10 LAB
COPATH REPORT: NORMAL
PAP: NORMAL

## 2020-08-17 NOTE — PROGRESS NOTES
"  IUD Insertion:  CONSULT:    Is a pregnancy test required: Yes.  Was it positive or negative?  Negative  Was a consent obtained?  Yes    Subjective: Zuleyka Leslie is a 30 year old  presents for IUD and desires Mirena type IUD.    Patient has been given the opportunity to ask questions about all forms of birth control, including all options appropriate for Zuleyka Leslie. Discussed that no method of birth control, except abstinence is 100% effective against pregnancy or sexually transmitted infection.     Zuleyka Leslie understands she may have the IUD removed at any time. IUD should be removed by a health care provider.    The entire insertion procedure was reviewed with the patient, including care after placement.    Patient's last menstrual period was 2020 (exact date). Last sexual activity: last week. No allergy to betadine or shellfish. Patient declines STD screening  HCG Qual Urine   Date Value Ref Range Status   2020 Negative NEG^Negative Final     Comment:     This test is for screening purposes.  Results should be interpreted along with   the clinical picture.  Confirmation testing is available if warranted by   ordering RTV188, HCG Quantitative Pregnancy.           /64   Pulse 74   Temp 98.1  F (36.7  C) (Temporal)   Resp 16   Ht 1.683 m (5' 6.25\")   Wt 66.7 kg (147 lb)   LMP 2020 (Exact Date)   SpO2 99%   BMI 23.55 kg/m      Pelvic Exam:   EG/BUS: normal genital architecture without lesions, erythema or abnormal secretions.   Vagina: moist, pink, rugae with physiologic discharge and secretions  Cervix: nulliparous no lesions and pink, moist, closed, without lesion or CMT  Uterus: position, mobile, no pain  Adnexa: within normal limits and no masses, nodularity, tenderness    PROCEDURE NOTE: -- IUD Insertion    Reason for Insertion: contraception    Premedicated with ibuprofen.  Under sterile technique, cervix was visualized with speculum and prepped " with Betadine solution swab x 3. Tenaculum was placed for stability. The uterus was gently straightened and sounded to 7.0 cm. IUD prepared for placement, and IUD inserted according to 's instructions without difficulty or significant resitance, and deployed at the fundus. The strings were visualized and trimmed to 3.0 cm from the external os. Tenaculum was removed and hemostasis noted. Speculum removed.  Patient tolerated procedure well.        EBL: minimal    Complications: none    ASSESSMENT:     ICD-10-CM    1. Encounter for insertion of intrauterine contraceptive device  Z30.430 HCG Qual, Urine ([id number])     levonorgestrel (MIRENA) 20 MCG/24HR IUD     levonorgestrel (MIRENA) 20 MCG/24HR IUD 20 mcg     INSERTION INTRAUTERINE DEVICE        PLAN:    Given 's handouts, including when to have IUD removed, list of danger s/sx, side effects and follow up recommended. Encouraged condom use for prevention of STD. Back up contraception advised for 7 days if progestin method. Advised to call for any fever, for prolonged or severe pain or bleeding, abnormal vaginal discharge, or unable to palpate strings. She was advised to use pain medications (ibuprofen) as needed for mild to moderate pain. Advised to follow-up in clinic in 4-6 weeks for IUD string check if unable to find strings or as directed by provider.     Dipti Sutton MD, MD

## 2020-08-20 ENCOUNTER — OFFICE VISIT (OUTPATIENT)
Dept: FAMILY MEDICINE | Facility: OTHER | Age: 30
End: 2020-08-20
Payer: COMMERCIAL

## 2020-08-20 VITALS
RESPIRATION RATE: 16 BRPM | HEART RATE: 74 BPM | BODY MASS INDEX: 23.63 KG/M2 | SYSTOLIC BLOOD PRESSURE: 118 MMHG | DIASTOLIC BLOOD PRESSURE: 64 MMHG | WEIGHT: 147 LBS | TEMPERATURE: 98.1 F | OXYGEN SATURATION: 99 % | HEIGHT: 66 IN

## 2020-08-20 DIAGNOSIS — Z30.430 ENCOUNTER FOR INSERTION OF INTRAUTERINE CONTRACEPTIVE DEVICE: Primary | ICD-10-CM

## 2020-08-20 LAB — HCG UR QL: NEGATIVE

## 2020-08-20 PROCEDURE — 58300 INSERT INTRAUTERINE DEVICE: CPT | Performed by: FAMILY MEDICINE

## 2020-08-20 PROCEDURE — 81025 URINE PREGNANCY TEST: CPT | Performed by: FAMILY MEDICINE

## 2020-08-20 RX ORDER — COPPER 313.4 MG/1
1 INTRAUTERINE DEVICE INTRAUTERINE ONCE
Status: CANCELLED
Start: 2020-08-20 | End: 2020-08-20

## 2020-08-20 RX ORDER — COPPER 313.4 MG/1
1 INTRAUTERINE DEVICE INTRAUTERINE ONCE
Status: CANCELLED | COMMUNITY

## 2020-08-20 ASSESSMENT — MIFFLIN-ST. JEOR: SCORE: 1417.51

## 2020-08-20 ASSESSMENT — PAIN SCALES - GENERAL: PAINLEVEL: NO PAIN (0)

## 2020-09-02 ENCOUNTER — NURSE TRIAGE (OUTPATIENT)
Dept: NURSING | Facility: CLINIC | Age: 30
End: 2020-09-02

## 2020-09-02 NOTE — TELEPHONE ENCOUNTER
Zuleyka is calling with questions on covid testing.  Currently denies fever cough and shortness of breath.      COVID 19 Nurse Triage Plan/Patient Instructions    Please be aware that novel coronavirus (COVID-19) may be circulating in the community. If you develop symptoms such as fever, cough, or SOB or if you have concerns about the presence of another infection including coronavirus (COVID-19), please contact your health care provider or visit [email or web address].     Disposition/Instructions    Home care recommended. Follow home care protocol based instructions.    Thank you for taking steps to prevent the spread of this virus.  o Limit your contact with others.  o Wear a simple mask to cover your cough.  o Wash your hands well and often.    Resources    [facility]: About COVID-19: [email or web address]    CDC: What to Do If You're Sick: www.cdc.gov/coronavirus/2019-ncov/about/steps-when-sick.html    CDC: Ending Home Isolation: www.cdc.gov/coronavirus/2019-ncov/hcp/disposition-in-home-patients.html     CDC: Caring for Someone: www.cdc.gov/coronavirus/2019-ncov/if-you-are-sick/care-for-someone.html     [facility]: Interim Guidance for Hospital Discharge to Home: www.[facility].mn.us/diseases/coronavirus/hcp/hospdischarge.pdf    [facility] clinical trials (COVID-19 research studies): clinicalaffairs.[facility]-clinical-trials     Below are the COVID-19 hotlines at the [facility] ([facility]). Interpreters are available.   o For health questions: Call 007-740-3915 or 1-102.387.7508 (7 a.m. to 7 p.m.)  o For questions about schools and childcare: Call 363-131-9823 or 1-495.409.7244 (7 a.m. to 7 p.m.)                       Reason for Disposition    COVID-19 Testing, questions about    Additional Information    Negative: SEVERE difficulty breathing (e.g., struggling for each breath, speaks in single words)    Negative: Difficult to awaken or acting confused (e.g., disoriented, slurred  speech)    Negative: Bluish (or gray) lips or face now    Negative: Shock suspected (e.g., cold/pale/clammy skin, too weak to stand, low BP, rapid pulse)    Negative: Sounds like a life-threatening emergency to the triager    Negative: SEVERE or constant chest pain or pressure (Exception: mild central chest pain, present only when coughing)    Negative: MODERATE difficulty breathing (e.g., speaks in phrases, SOB even at rest, pulse 100-120)    Negative: Patient sounds very sick or weak to the triager    Negative: MILD difficulty breathing (e.g., minimal/no SOB at rest, SOB with walking, pulse <100)    Negative: Chest pain or pressure    Negative: Fever > 103 F (39.4 C)    Negative: [1] Fever > 101 F (38.3 C) AND [2] age > 60    Negative: [1] Fever > 100.0 F (37.8 C) AND [2] bedridden (e.g., nursing home patient, CVA, chronic illness, recovering from surgery)    Negative: HIGH RISK patient (e.g., age > 64 years, diabetes, heart or lung disease, weak immune system) (Exception: Has already been evaluated by healthcare provider and has no new or worsening symptoms)    Negative: Fever present > 3 days (72 hours)    Negative: [1] Fever returns after gone for over 24 hours AND [2] symptoms worse or not improved    Negative: [1] Continuous (nonstop) coughing interferes with work or school AND [2] no improvement using cough treatment per protocol    Negative: [1] COVID-19 infection suspected by caller or triager AND [2] mild symptoms (cough, fever, or others) AND [3] no complications or SOB    Negative: Cough present > 3 weeks    Negative: [1] COVID-19 diagnosed by positive lab test AND [2] mild symptoms (e.g., cough, fever, others) AND [3] no complications or SOB    Negative: [1] COVID-19 diagnosed by HCP (doctor, NP or PA) AND [2] mild symptoms (e.g., cough, fever, others) AND [3] no complications or SOB    Negative: COVID-19 Home Isolation, questions about    Protocols used: CORONAVIRUS (COVID-19) DIAGNOSED OR  [id number] 8.4.20

## 2020-09-08 ENCOUNTER — MYC MEDICAL ADVICE (OUTPATIENT)
Dept: FAMILY MEDICINE | Facility: OTHER | Age: 30
End: 2020-09-08

## 2020-09-08 ENCOUNTER — VIRTUAL VISIT (OUTPATIENT)
Dept: FAMILY MEDICINE | Facility: OTHER | Age: 30
End: 2020-09-08

## 2020-09-08 NOTE — PROGRESS NOTES
"Date: 2020 11:34:29  Clinician: Yuri Iglesias  Clinician NPI: [id number]  Patient: Zuleyka Leslie  Patient : 1990  Patient Address: [address] [place] [address]  Patient Phone: [phone]  Visit Protocol: URI  Patient Summary:  Zuleyka is a 30 year old ( : 1990 ) female who initiated a Visit for COVID-19 (Coronavirus) evaluation and screening. When asked the question \"Please sign me up to receive news, health information and promotions. \", Zuleyka responded \"No\".    Zuleyka states her symptoms started suddenly 3-4 days ago.   Her symptoms consist of a sore throat, a cough, nasal congestion, and malaise. She is experiencing difficulty breathing due to nasal congestion but she is not short of breath.   Symptom details     Nasal secretions: The color of her mucus is clear.    Cough: Zuleyka coughs a few times an hour and her cough is more bothersome at night. Phlegm does not come into her throat when she coughs. She believes her cough is caused by post-nasal drip.     Sore throat: Zuleyka reports having mild throat pain (1-3 on a 10 point pain scale), does not have exudate on her tonsils, and can swallow liquids. She is not sure if the lymph nodes in her neck are enlarged. A rash has not appeared on the skin since the sore throat started.      Zuleyka denies having ear pain, anosmia, facial pain or pressure, fever, vomiting, rhinitis, nausea, wheezing, teeth pain, ageusia, diarrhea, headache, chills, and myalgias. She also denies taking antibiotic medication in the past month, having recent facial or sinus surgery in the past 60 days, and double sickening (worsening symptoms after initial improvement).   Precipitating events  Within the past week, Zuleyka has not been exposed to someone with strep throat. She has not recently been exposed to someone with influenza. Zuleyka has not been in close contact with any high risk individuals.   Pertinent COVID-19 (Coronavirus) information  In the " past 14 days, Zuleyka has not worked in a congregate living setting.   She does not work or volunteer as healthcare worker or a  and does not work or volunteer in a healthcare facility.   Zuleyka also has not lived in a congregate living setting in the past 14 days. She does not live with a healthcare worker.   Zuleyka has had a close contact with a laboratory-confirmed COVID-19 patient within 14 days of symptom onset.   Since December 2019, Zuleyka and has had upper respiratory infection (URI) or influenza-like illness. Has not been diagnosed with lab-confirmed COVID-19 test      Date(s) of previous URI or influenza-like illness (free-text): It was only one day I was really sick for, right around the time Corona awareness first started.     Symptoms Zuleyka experienced during previous URI or influenza-like illness as reported by the patient (free-text): fever, chills while being bundled up, fatigue, hoarse cough, diarrhea, and nausea.        Pertinent medical history  Zuleyka does not get yeast infections when she takes antibiotics.   Zuleyka needs a return to work/school note.   Weight: 147 lbs   Zuleyka does not smoke or use smokeless tobacco.   She denies pregnancy and denies breastfeeding. She is currently menstruating.   Additional information as reported by the patient (free text): I didn't take all of my fatigue and headache symptoms seriously because I've had my period for almost 12 days now after getting my first IUD on the 20th of last month. I often will get a little sick around when I first get my period, so when I first got a sore throat from nasal dripping, I thought it was just allergies or my usual period-cold. I've been taking Midol for almost 3 weeks straight as my body adjusts to all the cramping from the IUD, so my body is feeling all sorts of wonky right now   Weight: 147 lbs    MEDICATIONS: No current medications, ALLERGIES: NKDA  Clinician Response:  Dear Zuleyka,    &nbsp;  Zuleyka,  Be  "sure your caregiver who placed the IUD is aware of your daily bleeding.&nbsp; I will initiate you being tested for covid.  MANNIE Iglesias MD Your symptoms show that you may have coronavirus (COVID-19). This illness can cause fever, cough and trouble breathing. Many people get a mild case and get better on their own. Some people can get very sick.  What should I do?  We would like to test you for this virus.   1. Please call [phone] to schedule your visit. Explain that you were referred by [facility] to have a COVID-19 test. Be [facility].  The following will serve as your written order for this COVID Test, ordered by me, for the indication of suspected COVID [Z20.828]: The test will be ordered in [employer], our electronic health record, after you are scheduled. It will show as ordered and authorized by Best Fine MD.  Order: COVID-19 (Coronavirus) PCR for SYMPTOMATIC testing from [facility].     2. When it's time for your COVID test:  Stay at least 6 feet away from others. (If someone will drive you to your test, stay in the backseat, as far away from the  as you can.)   Cover your mouth and nose with a mask, tissue or washcloth.  Go straight to the testing site. Don't make any stops on the way there or back.      3.Starting now: Stay home and away from others (self-isolate) until:   You've had no fever---and no medicine that reduces fever---for one full day (24 hours). And...   Your other symptoms have gotten better. For example, your cough or breathing has improved. And...   At least 10 days have passed since your symptoms started.       During this time, don't leave the house except for testing or medical care.   Stay in your own room, even for meals. Use your own bathroom if you can.   Stay away from others in your home. No hugging, kissing or shaking hands. No visitors.  Don't go to work, school or anywhere else.    Clean \"high touch\" surfaces often (doorknobs, counters, handles, " etc.). Use a household cleaning spray or wipes. You'll find a full list of  on the EPA website: www.epa.gov/pesticide-registration/list-n-disinfectants-use-against-sars-cov-2.   Cover your mouth and nose with a mask, tissue or washcloth to avoid spreading germs.  Wash your hands and face often. Use soap and water.  Caregivers in these groups are at risk for severe illness due to COVID-19:  o People 65 years and older  o People who live in a nursing home or long-term care facility  o People with chronic disease (lung, heart, cancer, diabetes, kidney, liver, immunologic)  o People who have a weakened immune system, including those who:   Are in cancer treatment  Take medicine that weakens the immune system, such as corticosteroids  Had a bone marrow or organ transplant  Have an immune deficiency  Have poorly controlled HIV or AIDS  Are obese (body mass index of 40 or higher)  Smoke regularly   o Caregivers should wear gloves while washing dishes, handling laundry and cleaning bedrooms and bathrooms.  o Use caution when washing and drying laundry: Don't shake dirty laundry, and use the warmest water setting that you can.  o For more tips, go to www.cdc.gov/coronavirus/2019-ncov/downloads/10Things.pdf.    4.Sign up for [employer]. We know it's scary to hear that you might have COVID-19. We want to track your symptoms to make sure you're okay over the next 2 weeks. Please look for an email from [employer]---this is a free, online program that we'll use to keep in touch. To sign up, follow the link in the email. Learn more at [email or web address]  How can I take care of myself?   Get lots of rest. Drink extra fluids (unless a doctor has told you not to).   Take Tylenol (acetaminophen) for fever or pain. If you have liver or kidney problems, ask your family doctor if it's okay to take Tylenol.   Adults can take either:    650 mg (two 325 mg pills) every 4 to 6 hours, or...   1,000 mg (two 500 mg  pills) every 8 hours as needed.    Note: Don't take more than 3,000 mg in one day. Acetaminophen is found in many medicines (both prescribed and over-the-counter medicines). Read all labels to be sure you don't take too much.   For children, check the Tylenol bottle for the right dose. The dose is based on the child's age or weight.    If you have other health problems (like cancer, heart failure, an organ transplant or severe kidney disease): Call your specialty clinic if you don't feel better in the next 2 days.       Know when to call 911. Emergency warning signs include:    Trouble breathing or shortness of breath Pain or pressure in the chest that doesn't go away Feeling confused like you haven't felt before, or not being able to wake up Bluish-colored lips or face.  Where can I get more information?   [facility] -- About COVID-19: [email or web address]   CDC -- What to Do If You're Sick: www.cdc.gov/coronavirus/2019-ncov/about/steps-when-sick.html   CDC -- Ending Home Isolation: www.cdc.gov/coronavirus/2019-ncov/hcp/disposition-in-home-patients.html   CDC -- Caring for Someone: www.cdc.gov/coronavirus/2019-ncov/if-you-are-sick/care-for-someone.html   [facility] -- Interim Guidance for Hospital Discharge to Home: www.health.[facility].mn.us/diseases/coronavirus/hcp/hospdischarge.pdf   [facility] clinical trials (COVID-19 research studies): clinicalaffairs.[facility]/n-clinical-trials    Below are the COVID-19 hotlines at the [place] [facility] ([facility]). Interpreters are available.    For health questions: Call 760-645-3751 or 1-126.967.2339 (7 a.m. to 7 p.m.) For questions about schools and childcare: Call 610-156-8228 or 1-829.719.3386 (7 a.m. to 7 p.m.)     Diagnosis: Cough  Diagnosis ICD: R05

## 2020-09-09 ASSESSMENT — PATIENT HEALTH QUESTIONNAIRE - PHQ9
SUM OF ALL RESPONSES TO PHQ QUESTIONS 1-9: 11
10. IF YOU CHECKED OFF ANY PROBLEMS, HOW DIFFICULT HAVE THESE PROBLEMS MADE IT FOR YOU TO DO YOUR WORK, TAKE CARE OF THINGS AT HOME, OR GET ALONG WITH OTHER PEOPLE: SOMEWHAT DIFFICULT
SUM OF ALL RESPONSES TO PHQ QUESTIONS 1-9: 11

## 2020-09-09 ASSESSMENT — ANXIETY QUESTIONNAIRES
GAD7 TOTAL SCORE: 8
4. TROUBLE RELAXING: SEVERAL DAYS
GAD7 TOTAL SCORE: 8
5. BEING SO RESTLESS THAT IT IS HARD TO SIT STILL: SEVERAL DAYS
6. BECOMING EASILY ANNOYED OR IRRITABLE: SEVERAL DAYS
GAD7 TOTAL SCORE: 8
7. FEELING AFRAID AS IF SOMETHING AWFUL MIGHT HAPPEN: NOT AT ALL
7. FEELING AFRAID AS IF SOMETHING AWFUL MIGHT HAPPEN: NOT AT ALL
3. WORRYING TOO MUCH ABOUT DIFFERENT THINGS: SEVERAL DAYS
2. NOT BEING ABLE TO STOP OR CONTROL WORRYING: SEVERAL DAYS
1. FEELING NERVOUS, ANXIOUS, OR ON EDGE: NEARLY EVERY DAY

## 2020-09-10 ASSESSMENT — ANXIETY QUESTIONNAIRES: GAD7 TOTAL SCORE: 8

## 2020-09-10 ASSESSMENT — PATIENT HEALTH QUESTIONNAIRE - PHQ9: SUM OF ALL RESPONSES TO PHQ QUESTIONS 1-9: 11

## 2020-09-15 ENCOUNTER — MYC MEDICAL ADVICE (OUTPATIENT)
Dept: FAMILY MEDICINE | Facility: OTHER | Age: 30
End: 2020-09-15

## 2020-09-16 ENCOUNTER — NURSE TRIAGE (OUTPATIENT)
Dept: FAMILY MEDICINE | Facility: OTHER | Age: 30
End: 2020-09-16

## 2020-09-16 ENCOUNTER — OFFICE VISIT (OUTPATIENT)
Dept: OBGYN | Facility: OTHER | Age: 30
End: 2020-09-16
Payer: COMMERCIAL

## 2020-09-16 VITALS
WEIGHT: 147.5 LBS | SYSTOLIC BLOOD PRESSURE: 122 MMHG | TEMPERATURE: 98.1 F | DIASTOLIC BLOOD PRESSURE: 60 MMHG | BODY MASS INDEX: 23.63 KG/M2 | HEART RATE: 72 BPM

## 2020-09-16 DIAGNOSIS — Z97.5 IUD (INTRAUTERINE DEVICE) IN PLACE: ICD-10-CM

## 2020-09-16 DIAGNOSIS — Z30.430 ENCOUNTER FOR IUD INSERTION: ICD-10-CM

## 2020-09-16 DIAGNOSIS — Z30.432 ENCOUNTER FOR IUD REMOVAL: Primary | ICD-10-CM

## 2020-09-16 PROCEDURE — 58301 REMOVE INTRAUTERINE DEVICE: CPT | Performed by: ADVANCED PRACTICE MIDWIFE

## 2020-09-16 PROCEDURE — 58300 INSERT INTRAUTERINE DEVICE: CPT | Performed by: ADVANCED PRACTICE MIDWIFE

## 2020-09-16 RX ORDER — COPPER 313.4 MG/1
1 INTRAUTERINE DEVICE INTRAUTERINE ONCE
Qty: 1 EACH | Refills: 0
Start: 2020-09-16 | End: 2020-09-16

## 2020-09-16 RX ORDER — COPPER 313.4 MG/1
1 INTRAUTERINE DEVICE INTRAUTERINE ONCE
Status: COMPLETED
Start: 2020-09-16 | End: 2020-09-16

## 2020-09-16 RX ADMIN — COPPER 1 EACH: 313.4 INTRAUTERINE DEVICE INTRAUTERINE at 16:36

## 2020-09-16 NOTE — TELEPHONE ENCOUNTER
Generally less systemic hormones over time and it will continue to suppress hormones the longer on it over a few months. But if not tolerable, can remove and make new plan. There are non-hormonal methosd and we discussed this at the appt. If she wants to just replaced, can schedule this appt. IF she wants to discuss options first, can set up as virtual visit to make a plan first.  Dipti Sutton MD

## 2020-09-16 NOTE — PATIENT INSTRUCTIONS
What Paragard Users May Expect    What to watch for right after Paragard is placed  Some women may experience uterine cramps, bleeding, and/or dizziness during and right after Paragard is placed. To help minimize the cramps, you may taken ibuprofen 600 mg with food prior to your appointment. These symptoms should improve over the next 24 hours.  Mild cramping may be present for a few days after your placement  As a follow up, you should visit your clinic once in the first 4 to 12 weeks after Paragard is placed to make sure it is in the right position. After that, Paragard can be checked once a year as part of your routine exam.        Your periods may change  Some women may have heavier and longer periods with cramping for a while; some may have spotting between periods. These side effects usually lessen after a few months. However, if your menstrual flow is severe or prolonged, call your healthcare professional. You should also call your healthcare professional if you miss a period.    Paragard Strings  You may check your own Paragard strings by inserting a finger into the vagina and feeling the strings as they exit the cervix.  The strings will initially feel firm, but will soften over a few weeks.  After the strings have softened, you or your partner should not be able to feel the strings during intercourse.  If you can feel the IUD, see your healthcare provider to have the position confirmed.  You may continue to use tampons with the IUD in place.    Call the clinic immediately if you:  Notice any change in the length of the strings or can feel part of the IUD  Have pain or bleeding with sex.  Have unusually heavy bleeding from the vagina.   Think you are pregnant  Have been or might have been exposed to a sexually transmitted infection  Have unusual pelvic pain, cramping or soreness in your abdomen  Have unexplained fever or chills.       Paragard does not protect against HIV or STDs.  Paragard does not prevent  the formation of ovarian cysts.  Paragard does not typically reduce acne or cause weight gain or mood changes.    Please call [facility]'s at  [place] [phone] if you have questions or concerns.    For more information:  [email or web address]      If you feel that your period are heavy, taking ibuprofen 800 mg three times a day or naprosyn 500 mg twice a day on your heaviest days can decrease the bleeding by 20-30 %.

## 2020-09-16 NOTE — TELEPHONE ENCOUNTER
LM for the patient to return call to the clinic. Please transfer to a triage RN.     Patient had IUD inserted on 08/20/20. It takes time for the body to adjust to the IUD. Typically up to six months for the bleeding.     Will route to  to advise how the patient is feeling.     Chico Purcell RN  September 16, 2020

## 2020-09-16 NOTE — TELEPHONE ENCOUNTER
LM for patient to return call. Transfer to triage RN.     Will also reach out via [employer]t message.     Kristi Collins RN BSN

## 2020-09-16 NOTE — TELEPHONE ENCOUNTER
Spoke to patient.     States today she is feeling somewhat better.   States maybe her message was too much as she was in the middle of a mood swing.     States she was having thoughts of bashing her head into things.   States she knows she doesn't want to hurt herself and others.   Having trouble getting the thoughts to stop when they do happen.  States she writes it out to get the feelings out.   States she is having trouble falling asleep last night.   States today she felt better.     States when she have those thoughts she feels out of control emotionally or mentally.     States she knows herself well enough to know that it is the hormones that are contributing to these thoughts.     As she has had these issues before.     States she doesn't want to treat the symptoms.   States she wants to change the IUD.     Doesn't want to wait out the 3 month adjustment period.     RN advised appointment for today to discuss mood changes. Patient states she does not want to set that up as she is feeling much better today and she only would like to schedule her IUD consult for replacement/removal.   RN advised that these thoughts are serious and strongly encouraged appointment for today. Patient declined. RN advised that if patient has these thoughts return to call back, call a suicide hotline, or reach out to 911. Patient states understanding.     Kristi Collins RN BSN      Additional Information    Negative: Patient attempted suicide    Negative: Patient is threatening suicide now    Negative: Violent behavior, or threatening to physically hurt or kill someone    Negative: Patient is very confused (disoriented, slurred speech) and no other adult (e.g., friend or family member) available    Negative: Difficult to awaken or acting very confused (disoriented, slurred speech) and of new onset    Negative: Sounds like a life-threatening emergency to the triager    Negative: Depression is the main symptom and is not threatening  suicide    Negative: Patient sounds very sick or weak to the triager    Negative: Patient is not threatening suicide now BUT has shared a suicide plan (e.g., overdose, gunshot) and access (e.g., hoarding pills, firearm in house)    Sometimes has thoughts of suicide    Protocols used: SUICIDE PHDEMGOE-B-SC

## 2020-09-16 NOTE — PROGRESS NOTES
CC/HPI: Zuleyka Leslie is a 30 year old who presents to the clinic for an IUD removal and re  insertion.   Feels that she has adverse mental reactions to hormonal birth control. Felt significantly better off OCPs.  Had a mirena placed about a month ago and feels like her mood has been negatively impacted.  Wants the mirena removed and a paragard placed.   No LMP recorded (lmp unknown). (Menstrual status: IUD)..  Current birth control method: IUD    Indication for insertion:  birth control    Patient has been provided with written information.  I have reviewed the risks of the IUD including pregnancy, PID, life threatening infection, perforation, expulsion, cramping and changes in bleeding.. Benefits of the IUD and alternative birth control/cycle control methods have been discussed.  Patients questions have been answered.  Patient has verbalized understanding of risks and benefits and has signed the consent form.    Allergies   Allergen Reactions     Nkda [No Known Drug Allergies]      Current Outpatient Medications   Medication Sig Dispense Refill     levonorgestrel (MIRENA) 20 MCG/24HR IUD 1 each (20 mcg) by Intrauterine route once        Past Medical History:   Diagnosis Date     NO ACTIVE PROBLEMS      Family History   Problem Relation Age of Onset     Cancer Maternal Grandfather         lung CA     Cancer Maternal Grandmother         lung CA     Diabetes Maternal Grandmother      Alzheimer Disease Maternal Grandmother      Lactose Intolerance Maternal Grandmother      Thyroid Disease Mother      Colon Cancer Father      C.A.D. No family hx of      Social History     Socioeconomic History     Marital status: Single     Spouse name: Not on file     Number of children: 0     Years of education: Not on file     Highest education level: Not on file   Occupational History     Comment: [employer]   Social Needs     Financial resource strain: Not on file     Food insecurity     Worry: Not on file      "Inability: Not on file     Transportation needs     Medical: Not on file     Non-medical: Not on file   Tobacco Use     Smoking status: Never Smoker     Smokeless tobacco: Never Used   Substance and Sexual Activity     Alcohol use: Yes     Alcohol/week: 4.2 standard drinks     Types: 5 Shots of liquor per week     Comment: \"social\", once every 1 to 2 wks     Drug use: Yes     Types: Marijuana     Sexual activity: Yes     Partners: Male     Birth control/protection: I.U.D.   Lifestyle     Physical activity     Days per week: Not on file     Minutes per session: Not on file     Stress: Not on file   Relationships     Social connections     Talks on phone: Not on file     Gets together: Not on file     Attends Denominational service: Not on file     Active member of club or organization: Not on file     Attends meetings of clubs or organizations: Not on file     Relationship status: Not on file     Intimate partner violence     Fear of current or ex partner: Not on file     Emotionally abused: Not on file     Physically abused: Not on file     Forced sexual activity: Not on file   Other Topics Concern     Parent/sibling w/ CABG, MI or angioplasty before 65F 55M? Not Asked      Service No     Blood Transfusions No     Caffeine Concern No     Occupational Exposure No     Hobby Hazards No     Sleep Concern No     Stress Concern No     Weight Concern No     Special Diet No     Back Care No     Exercise No     Bike Helmet No     Seat Belt Yes     Self-Exams No   Social History Narrative     Not on file     Past Surgical History:   Procedure Laterality Date     APPENDECTOMY  age 7     C ORAL SURGERY PROCEDURE      wisdom tooth extracted     C ORTHODONTIC PROCEDURE           EXAM:  /60 (BP Location: Right arm, Patient Position: Sitting, Cuff Size: Adult Regular)   Pulse 72   Temp 98.1  F (36.7  C) (Oral)   Wt 66.9 kg (147 lb 8 oz)   LMP  (LMP Unknown)   BMI 23.63 kg/m    PELVIC EXAM:  Vulva: No external lesions, " normal hair distribution, no adenopathy, BUS WNL  Vagina: Moist, pink, no abnormal discharge, well rugated, no lesions  Cervix: smooth, pink, no visible lesions, neg CMT The IUD strings were identified at the cervical os  They were easily grasped with a ring forceps and with gentle steady traction the IUD was removed from the uterus intact and disposed of following the hazardous waste guidelines.     Uterus: Normal size, Retroverted , non-tender, mobile  Ovaries: No mass, non-tender, mobile  Rectal exam: deferred    IUD type: Paragard  Lot # [id number]   NDC# [id number]      EXP DATE:   1/2025        Procedure:  Uterus assessed for position and is Retroverted.  Speculum inserted.  Betadine prep of cervix done.  Tenaculum applied at  10/2  o'clock position and gentle traction was appiled to elongate the cervical canal.  Uterus sounded to 7 cm's.   IUD inserted in the usual fashion according to the manufacture's instructions,  without significant resistance, severe protracted pain or excessive bleeding. The tenaculum was removed with scant bleeding from the puncture sites  Strings trimmed to 3 cm's.  Patient  tolerated the procedure well without any prolonged pain or syncopy.    ASSESSMENT/ PLAN:  (Z30.432) Encounter for IUD removal  (primary encounter diagnosis)  Comment:   Plan:     (Z30.430) Encounter for IUD insertion  Comment:   Plan:     (Z97.5) IUD (intrauterine device) in place  Comment:   Plan:           Instructions given to patient regarding checking IUD strings, returning to the clinic if pain or inability to check strings and/or irregular bleeding and to avoid placing anything in her vagina for the next 24 hours.  BUM of birth control not indicated     Return to the clinic in 4-6 weeks for IUD follow up   See AVS for complete instructions

## 2020-11-10 ENCOUNTER — E-VISIT (OUTPATIENT)
Dept: URGENT CARE | Facility: CLINIC | Age: 30
End: 2020-11-10
Payer: COMMERCIAL

## 2020-11-10 DIAGNOSIS — Z20.822 SUSPECTED COVID-19 VIRUS INFECTION: Primary | ICD-10-CM

## 2020-11-10 PROCEDURE — 99421 OL DIG E/M SVC 5-10 MIN: CPT | Performed by: EMERGENCY MEDICINE

## 2020-11-10 NOTE — PATIENT INSTRUCTIONS
Dear Zuleyka Leslie,    Your symptoms show that you may have coronavirus (COVID-19). This illness can cause fever, cough and trouble breathing. Many people get a mild case and get better on their own. Some people can get very sick.    Will I be tested for COVID-19?  We would like to test you for this virus. I have placed an order for this test and you will be called to schedule your COVID-19 curbside test. If you need to schedule in [facility] please call [phone]. If you need to schedule in the [place] area please call [phone].    When it's time for your COVID test:  Stay at least 6 feet away from others. (If someone will drive you to your test, stay in the backseat, as far away from the  as you can.)  Cover your mouth and nose with a mask, tissue or washcloth.  Go straight to the testing site. Don't make any stops on the way there or back.    Starting now:     Do not go to work. Follow your usual processes for taking time away from work.  o If you receive a negative COVID-19 test result and were NOT exposed to someone with a known positive COVID-19 test, you can return to work once you're free of fever for 24 hours without fever-reducing medication and your symptoms are improving or resolved.  o If you receive a positive COVID-19 test result, return to work should be at least 10 days from symptom onset (20 days if people have immune compromise) and people should be fever-free for 24 hours without medications, and the respiratory symptoms should be improved significantly before returning to work or school  o If you were exposed to someone who has tested positive for COVID-19, you can return to work 14 days after your last contact with the positive individual, provided you do not have symptoms at all during that time.    During this time, don't leave the house except for testing or medical care.  o Stay in your own room, even for meals. Use your own bathroom if you can.  o Stay away  "from others in your home. No hugging, kissing or shaking hands. No visitors.  o Don't go to work, school or anywhere else.    Clean \"high touch\" surfaces often (doorknobs, counters, handles, etc.). Use a household cleaning spray or wipes. You'll find a full list of  on the EPA website: www.epa.gov/pesticide-registration/list-n-disinfectants-use-against-sars-cov-2.    Cover your mouth and nose with a mask, tissue or washcloth to avoid spreading germs.    Wash your hands and face often. Use soap and water.    People in these groups are at risk for severe illness due to COVID-19:  o People 65 years and older  o People who live in a nursing home or long-term care facility  o People with chronic disease (lung, heart, cancer, diabetes, kidney, liver, immunologic)  o People who have a weakened immune system, including those who:  - Are in cancer treatment  - Take medicine that weakens the immune system, such as corticosteroids  - Had a bone marrow or organ transplant  - Have an immune deficiency  - Have poorly controlled HIV or AIDS  - Are obese (body mass index of 40 or higher)  - Smoke regularly      Caregivers should wear gloves while washing dishes, handling laundry and cleaning bedrooms and bathrooms.    Use caution when washing and drying laundry: Don't shake dirty laundry, and use the warmest water setting that you can.    For more tips, go to www.cdc.gov/coronavirus/2019-ncov/downloads/10Things.pdf.    Sign up for [employer]. We know it's scary to hear that you might have COVID-19. We want to track your symptoms to make sure you're okay over the next 2 weeks. Please look for an email from [employer]--this is a free, online program that we'll use to keep in touch. To sign up, follow the link in the email you will receive. Learn more at [email or web address]    How can I take care of myself?    Get lots of rest. Drink extra fluids (unless a doctor has told you not to)    Take Tylenol " (acetaminophen) for fever or pain. If you have liver or kidney problems, ask your family doctor if it's okay to take Tylenol.  Adults can take either:    650 mg (two 325 mg pills) every 4 to 6 hours, or     1,000 mg (two 500 mg pills) every 8 hours as needed.    Note: Don't take more than 3,000 mg in one day. Acetaminophen is found in many medicines (both prescribed and over-the-counter medicines). Read all labels to be sure you don't take too much.  For children, check the Tylenol bottle for the right dose. The dose is based on the child's age or weight.    If you have other health problems (like cancer, heart failure, an organ transplant or severe kidney disease): Call your specialty clinic if you don't feel better in the next 2 days.    Know when to call 911. Emergency warning signs include:  Trouble breathing or shortness of breath  Pain or pressure in the chest that doesn't go away  Feeling confused like you haven't felt before, or not being able to wake up  Bluish-colored lips or face    Where can I get more information?    [facility] - About COVID-19: [email or web address]  CDC - What to Do If You're Sick: www.cdc.gov/coronavirus/2019-ncov/about/steps-when-sick.html

## 2020-12-12 ENCOUNTER — HEALTH MAINTENANCE LETTER (OUTPATIENT)
Age: 30
End: 2020-12-12

## 2021-03-30 NOTE — TELEPHONE ENCOUNTER
Called into [facility] [phone] pantoprazole 40mg bid #180 0 refills has office visit scheduled   I can not promise that I would be able to add any new or other treatment. I have not seen her for stomach issues this would recommend she discuss further with her PCP and maybe she has new insight for her.     Thank you  Hiwot Giang CNP

## 2021-09-26 ENCOUNTER — HEALTH MAINTENANCE LETTER (OUTPATIENT)
Age: 31
End: 2021-09-26

## 2021-10-19 PROBLEM — F32.9 MAJOR DEPRESSION: Status: ACTIVE | Noted: 2017-05-15

## 2023-01-08 ENCOUNTER — HEALTH MAINTENANCE LETTER (OUTPATIENT)
Age: 33
End: 2023-01-08

## 2024-02-11 ENCOUNTER — HEALTH MAINTENANCE LETTER (OUTPATIENT)
Age: 34
End: 2024-02-11